# Patient Record
Sex: FEMALE | Race: WHITE | NOT HISPANIC OR LATINO | Employment: OTHER | URBAN - METROPOLITAN AREA
[De-identification: names, ages, dates, MRNs, and addresses within clinical notes are randomized per-mention and may not be internally consistent; named-entity substitution may affect disease eponyms.]

---

## 2017-01-29 ENCOUNTER — COMMUNICATION - HEALTHEAST (OUTPATIENT)
Dept: INTERNAL MEDICINE | Facility: CLINIC | Age: 77
End: 2017-01-29

## 2017-03-03 ENCOUNTER — OFFICE VISIT - HEALTHEAST (OUTPATIENT)
Dept: INTERNAL MEDICINE | Facility: CLINIC | Age: 77
End: 2017-03-03

## 2017-03-03 DIAGNOSIS — J44.9 COPD (CHRONIC OBSTRUCTIVE PULMONARY DISEASE) (H): ICD-10-CM

## 2017-03-03 LAB
CHOLEST SERPL-MCNC: 188 MG/DL
FASTING STATUS PATIENT QL REPORTED: YES
HDLC SERPL-MCNC: 88 MG/DL
LDLC SERPL CALC-MCNC: 86 MG/DL
TRIGL SERPL-MCNC: 70 MG/DL

## 2017-03-03 ASSESSMENT — MIFFLIN-ST. JEOR: SCORE: 1129.73

## 2017-03-06 ENCOUNTER — COMMUNICATION - HEALTHEAST (OUTPATIENT)
Dept: INTERNAL MEDICINE | Facility: CLINIC | Age: 77
End: 2017-03-06

## 2017-03-27 ENCOUNTER — RECORDS - HEALTHEAST (OUTPATIENT)
Dept: ADMINISTRATIVE | Facility: OTHER | Age: 77
End: 2017-03-27

## 2017-04-04 ENCOUNTER — RECORDS - HEALTHEAST (OUTPATIENT)
Dept: ADMINISTRATIVE | Facility: OTHER | Age: 77
End: 2017-04-04

## 2017-04-12 ENCOUNTER — COMMUNICATION - HEALTHEAST (OUTPATIENT)
Dept: SCHEDULING | Facility: CLINIC | Age: 77
End: 2017-04-12

## 2017-04-17 ENCOUNTER — RECORDS - HEALTHEAST (OUTPATIENT)
Dept: ADMINISTRATIVE | Facility: OTHER | Age: 77
End: 2017-04-17

## 2017-05-07 ENCOUNTER — COMMUNICATION - HEALTHEAST (OUTPATIENT)
Dept: INTERNAL MEDICINE | Facility: CLINIC | Age: 77
End: 2017-05-07

## 2017-05-07 DIAGNOSIS — J44.9 COPD (CHRONIC OBSTRUCTIVE PULMONARY DISEASE) (H): ICD-10-CM

## 2017-05-15 ENCOUNTER — COMMUNICATION - HEALTHEAST (OUTPATIENT)
Dept: INTERNAL MEDICINE | Facility: CLINIC | Age: 77
End: 2017-05-15

## 2017-05-15 DIAGNOSIS — J44.9 COPD (CHRONIC OBSTRUCTIVE PULMONARY DISEASE) (H): ICD-10-CM

## 2017-05-23 ENCOUNTER — COMMUNICATION - HEALTHEAST (OUTPATIENT)
Dept: INTERNAL MEDICINE | Facility: CLINIC | Age: 77
End: 2017-05-23

## 2017-05-23 DIAGNOSIS — I10 HTN (HYPERTENSION): ICD-10-CM

## 2017-07-03 ENCOUNTER — OFFICE VISIT - HEALTHEAST (OUTPATIENT)
Dept: INTERNAL MEDICINE | Facility: CLINIC | Age: 77
End: 2017-07-03

## 2017-07-03 DIAGNOSIS — I10 ESSENTIAL HYPERTENSION WITH GOAL BLOOD PRESSURE LESS THAN 140/90: ICD-10-CM

## 2017-07-03 ASSESSMENT — MIFFLIN-ST. JEOR: SCORE: 1113.26

## 2017-09-03 ENCOUNTER — COMMUNICATION - HEALTHEAST (OUTPATIENT)
Dept: INTERNAL MEDICINE | Facility: CLINIC | Age: 77
End: 2017-09-03

## 2017-09-11 ENCOUNTER — COMMUNICATION - HEALTHEAST (OUTPATIENT)
Dept: SCHEDULING | Facility: CLINIC | Age: 77
End: 2017-09-11

## 2017-09-14 ENCOUNTER — COMMUNICATION - HEALTHEAST (OUTPATIENT)
Dept: INTERNAL MEDICINE | Facility: CLINIC | Age: 77
End: 2017-09-14

## 2017-09-17 ENCOUNTER — COMMUNICATION - HEALTHEAST (OUTPATIENT)
Dept: INTERNAL MEDICINE | Facility: CLINIC | Age: 77
End: 2017-09-17

## 2017-09-25 ENCOUNTER — RECORDS - HEALTHEAST (OUTPATIENT)
Dept: ADMINISTRATIVE | Facility: OTHER | Age: 77
End: 2017-09-25

## 2017-10-03 ENCOUNTER — OFFICE VISIT - HEALTHEAST (OUTPATIENT)
Dept: INTERNAL MEDICINE | Facility: CLINIC | Age: 77
End: 2017-10-03

## 2017-10-03 DIAGNOSIS — J44.9 CHRONIC OBSTRUCTIVE PULMONARY DISEASE, UNSPECIFIED COPD TYPE (H): ICD-10-CM

## 2017-10-03 ASSESSMENT — MIFFLIN-ST. JEOR: SCORE: 1090.03

## 2017-11-07 ENCOUNTER — RECORDS - HEALTHEAST (OUTPATIENT)
Dept: ADMINISTRATIVE | Facility: OTHER | Age: 77
End: 2017-11-07

## 2017-11-26 ENCOUNTER — COMMUNICATION - HEALTHEAST (OUTPATIENT)
Dept: MEDSURG UNIT | Facility: CLINIC | Age: 77
End: 2017-11-26

## 2017-11-26 DIAGNOSIS — J44.1 COPD EXACERBATION (H): ICD-10-CM

## 2017-12-05 ENCOUNTER — OFFICE VISIT - HEALTHEAST (OUTPATIENT)
Dept: INTERNAL MEDICINE | Facility: CLINIC | Age: 77
End: 2017-12-05

## 2017-12-05 DIAGNOSIS — J44.9 CHRONIC OBSTRUCTIVE PULMONARY DISEASE, UNSPECIFIED COPD TYPE (H): ICD-10-CM

## 2017-12-05 ASSESSMENT — MIFFLIN-ST. JEOR: SCORE: 1070.76

## 2017-12-17 ENCOUNTER — COMMUNICATION - HEALTHEAST (OUTPATIENT)
Dept: INTERNAL MEDICINE | Facility: CLINIC | Age: 77
End: 2017-12-17

## 2018-01-22 ENCOUNTER — RECORDS - HEALTHEAST (OUTPATIENT)
Dept: ADMINISTRATIVE | Facility: OTHER | Age: 78
End: 2018-01-22

## 2018-01-24 ENCOUNTER — COMMUNICATION - HEALTHEAST (OUTPATIENT)
Dept: INTERNAL MEDICINE | Facility: CLINIC | Age: 78
End: 2018-01-24

## 2018-01-25 ENCOUNTER — RECORDS - HEALTHEAST (OUTPATIENT)
Dept: ADMINISTRATIVE | Facility: OTHER | Age: 78
End: 2018-01-25

## 2018-02-06 ENCOUNTER — COMMUNICATION - HEALTHEAST (OUTPATIENT)
Dept: INTERNAL MEDICINE | Facility: CLINIC | Age: 78
End: 2018-02-06

## 2018-02-06 ENCOUNTER — OFFICE VISIT - HEALTHEAST (OUTPATIENT)
Dept: INTERNAL MEDICINE | Facility: CLINIC | Age: 78
End: 2018-02-06

## 2018-02-06 DIAGNOSIS — J44.9 CHRONIC OBSTRUCTIVE PULMONARY DISEASE, UNSPECIFIED COPD TYPE (H): ICD-10-CM

## 2018-02-06 LAB
CHOLEST SERPL-MCNC: 199 MG/DL
FASTING STATUS PATIENT QL REPORTED: NORMAL
HDLC SERPL-MCNC: 98 MG/DL
LDLC SERPL CALC-MCNC: 92 MG/DL
TRIGL SERPL-MCNC: 44 MG/DL

## 2018-02-06 ASSESSMENT — MIFFLIN-ST. JEOR: SCORE: 1070.76

## 2018-03-12 ENCOUNTER — RECORDS - HEALTHEAST (OUTPATIENT)
Dept: ADMINISTRATIVE | Facility: OTHER | Age: 78
End: 2018-03-12

## 2018-03-30 ENCOUNTER — COMMUNICATION - HEALTHEAST (OUTPATIENT)
Dept: INTERNAL MEDICINE | Facility: CLINIC | Age: 78
End: 2018-03-30

## 2018-04-17 ENCOUNTER — OFFICE VISIT - HEALTHEAST (OUTPATIENT)
Dept: INTERNAL MEDICINE | Facility: CLINIC | Age: 78
End: 2018-04-17

## 2018-04-17 DIAGNOSIS — J44.9 CHRONIC OBSTRUCTIVE PULMONARY DISEASE, UNSPECIFIED COPD TYPE (H): ICD-10-CM

## 2018-04-17 ASSESSMENT — MIFFLIN-ST. JEOR: SCORE: 1075.3

## 2018-05-14 ENCOUNTER — RECORDS - HEALTHEAST (OUTPATIENT)
Dept: ADMINISTRATIVE | Facility: OTHER | Age: 78
End: 2018-05-14

## 2018-05-27 ENCOUNTER — COMMUNICATION - HEALTHEAST (OUTPATIENT)
Dept: INTERNAL MEDICINE | Facility: CLINIC | Age: 78
End: 2018-05-27

## 2018-05-27 DIAGNOSIS — I10 HTN (HYPERTENSION): ICD-10-CM

## 2018-07-17 ENCOUNTER — OFFICE VISIT - HEALTHEAST (OUTPATIENT)
Dept: PODIATRY | Facility: CLINIC | Age: 78
End: 2018-07-17

## 2018-07-17 DIAGNOSIS — L84 TYLOMA: ICD-10-CM

## 2018-08-17 ENCOUNTER — OFFICE VISIT - HEALTHEAST (OUTPATIENT)
Dept: INTERNAL MEDICINE | Facility: CLINIC | Age: 78
End: 2018-08-17

## 2018-08-17 DIAGNOSIS — J44.9 CHRONIC OBSTRUCTIVE PULMONARY DISEASE, UNSPECIFIED COPD TYPE (H): ICD-10-CM

## 2018-08-17 ASSESSMENT — MIFFLIN-ST. JEOR: SCORE: 1075.3

## 2018-08-21 ENCOUNTER — RECORDS - HEALTHEAST (OUTPATIENT)
Dept: ADMINISTRATIVE | Facility: OTHER | Age: 78
End: 2018-08-21

## 2018-09-09 ENCOUNTER — COMMUNICATION - HEALTHEAST (OUTPATIENT)
Dept: INTERNAL MEDICINE | Facility: CLINIC | Age: 78
End: 2018-09-09

## 2018-10-08 ENCOUNTER — COMMUNICATION - HEALTHEAST (OUTPATIENT)
Dept: INTERNAL MEDICINE | Facility: CLINIC | Age: 78
End: 2018-10-08

## 2018-10-09 ENCOUNTER — AMBULATORY - HEALTHEAST (OUTPATIENT)
Dept: NURSING | Facility: CLINIC | Age: 78
End: 2018-10-09

## 2018-10-09 DIAGNOSIS — Z23 NEED FOR INFLUENZA VACCINATION: ICD-10-CM

## 2018-10-28 ENCOUNTER — COMMUNICATION - HEALTHEAST (OUTPATIENT)
Dept: MEDSURG UNIT | Facility: CLINIC | Age: 78
End: 2018-10-28

## 2018-10-28 DIAGNOSIS — J44.1 COPD EXACERBATION (H): ICD-10-CM

## 2018-10-29 RX ORDER — PREDNISONE 20 MG/1
TABLET ORAL
Qty: 8 TABLET | Refills: 0 | Status: SHIPPED | OUTPATIENT
Start: 2018-10-29 | End: 2021-07-26

## 2018-11-05 ENCOUNTER — OFFICE VISIT - HEALTHEAST (OUTPATIENT)
Dept: INTERNAL MEDICINE | Facility: CLINIC | Age: 78
End: 2018-11-05

## 2018-11-05 DIAGNOSIS — J44.9 CHRONIC OBSTRUCTIVE PULMONARY DISEASE, UNSPECIFIED COPD TYPE (H): ICD-10-CM

## 2018-11-05 ASSESSMENT — MIFFLIN-ST. JEOR: SCORE: 1066.22

## 2018-12-09 ENCOUNTER — COMMUNICATION - HEALTHEAST (OUTPATIENT)
Dept: INTERNAL MEDICINE | Facility: CLINIC | Age: 78
End: 2018-12-09

## 2019-01-13 ENCOUNTER — COMMUNICATION - HEALTHEAST (OUTPATIENT)
Dept: INTERNAL MEDICINE | Facility: CLINIC | Age: 79
End: 2019-01-13

## 2019-01-13 DIAGNOSIS — J44.9 COPD (CHRONIC OBSTRUCTIVE PULMONARY DISEASE) (H): ICD-10-CM

## 2019-02-11 ENCOUNTER — OFFICE VISIT - HEALTHEAST (OUTPATIENT)
Dept: INTERNAL MEDICINE | Facility: CLINIC | Age: 79
End: 2019-02-11

## 2019-02-11 DIAGNOSIS — I10 ESSENTIAL HYPERTENSION: ICD-10-CM

## 2019-02-11 LAB
CHOLEST SERPL-MCNC: 197 MG/DL
FASTING STATUS PATIENT QL REPORTED: YES
HDLC SERPL-MCNC: 106 MG/DL
LDLC SERPL CALC-MCNC: 82 MG/DL
TRIGL SERPL-MCNC: 47 MG/DL

## 2019-02-11 RX ORDER — DOCUSATE SODIUM 100 MG/1
100 CAPSULE, LIQUID FILLED ORAL 2 TIMES DAILY
Status: SHIPPED | COMMUNITY
Start: 2019-02-11

## 2019-02-11 ASSESSMENT — MIFFLIN-ST. JEOR: SCORE: 1075.3

## 2019-02-12 ENCOUNTER — COMMUNICATION - HEALTHEAST (OUTPATIENT)
Dept: INTERNAL MEDICINE | Facility: CLINIC | Age: 79
End: 2019-02-12

## 2019-02-16 ENCOUNTER — COMMUNICATION - HEALTHEAST (OUTPATIENT)
Dept: SCHEDULING | Facility: CLINIC | Age: 79
End: 2019-02-16

## 2019-02-16 DIAGNOSIS — M54.9 BACK PAIN: ICD-10-CM

## 2019-02-17 ENCOUNTER — COMMUNICATION - HEALTHEAST (OUTPATIENT)
Dept: SCHEDULING | Facility: CLINIC | Age: 79
End: 2019-02-17

## 2019-02-17 RX ORDER — CYCLOBENZAPRINE HCL 5 MG
5 TABLET ORAL 2 TIMES DAILY PRN
Qty: 20 TABLET | Refills: 1 | Status: SHIPPED | OUTPATIENT
Start: 2019-02-17 | End: 2022-02-11

## 2019-03-18 ENCOUNTER — RECORDS - HEALTHEAST (OUTPATIENT)
Dept: ADMINISTRATIVE | Facility: OTHER | Age: 79
End: 2019-03-18

## 2019-04-19 ENCOUNTER — RECORDS - HEALTHEAST (OUTPATIENT)
Dept: ADMINISTRATIVE | Facility: OTHER | Age: 79
End: 2019-04-19

## 2019-04-30 ENCOUNTER — RECORDS - HEALTHEAST (OUTPATIENT)
Dept: ADMINISTRATIVE | Facility: OTHER | Age: 79
End: 2019-04-30

## 2019-05-12 ENCOUNTER — COMMUNICATION - HEALTHEAST (OUTPATIENT)
Dept: INTERNAL MEDICINE | Facility: CLINIC | Age: 79
End: 2019-05-12

## 2019-05-12 DIAGNOSIS — I10 HTN (HYPERTENSION): ICD-10-CM

## 2019-06-17 ENCOUNTER — COMMUNICATION - HEALTHEAST (OUTPATIENT)
Dept: INTERNAL MEDICINE | Facility: CLINIC | Age: 79
End: 2019-06-17

## 2019-06-17 DIAGNOSIS — J44.9 COPD (CHRONIC OBSTRUCTIVE PULMONARY DISEASE) (H): ICD-10-CM

## 2019-07-02 ENCOUNTER — OFFICE VISIT - HEALTHEAST (OUTPATIENT)
Dept: INTERNAL MEDICINE | Facility: CLINIC | Age: 79
End: 2019-07-02

## 2019-07-02 DIAGNOSIS — J44.9 CHRONIC OBSTRUCTIVE PULMONARY DISEASE, UNSPECIFIED COPD TYPE (H): ICD-10-CM

## 2019-07-02 ASSESSMENT — MIFFLIN-ST. JEOR: SCORE: 1084.91

## 2019-09-23 ENCOUNTER — AMBULATORY - HEALTHEAST (OUTPATIENT)
Dept: NURSING | Facility: CLINIC | Age: 79
End: 2019-09-23

## 2019-09-23 DIAGNOSIS — Z23 FLU VACCINE NEED: ICD-10-CM

## 2019-10-17 ENCOUNTER — COMMUNICATION - HEALTHEAST (OUTPATIENT)
Dept: INTERNAL MEDICINE | Facility: CLINIC | Age: 79
End: 2019-10-17

## 2019-10-17 ENCOUNTER — OFFICE VISIT - HEALTHEAST (OUTPATIENT)
Dept: INTERNAL MEDICINE | Facility: CLINIC | Age: 79
End: 2019-10-17

## 2019-10-17 DIAGNOSIS — I10 ESSENTIAL HYPERTENSION: ICD-10-CM

## 2019-10-17 DIAGNOSIS — J44.9 CHRONIC OBSTRUCTIVE PULMONARY DISEASE, UNSPECIFIED COPD TYPE (H): ICD-10-CM

## 2019-10-17 DIAGNOSIS — F33.9 EPISODE OF RECURRENT MAJOR DEPRESSIVE DISORDER, UNSPECIFIED DEPRESSION EPISODE SEVERITY (H): ICD-10-CM

## 2019-10-17 LAB
CHOLEST SERPL-MCNC: 205 MG/DL
FASTING STATUS PATIENT QL REPORTED: ABNORMAL
HDLC SERPL-MCNC: 110 MG/DL
LDLC SERPL CALC-MCNC: 81 MG/DL
TRIGL SERPL-MCNC: 69 MG/DL

## 2019-10-17 RX ORDER — LACTOBACILLUS RHAMNOSUS GG 15B CELL
1 CAPSULE, SPRINKLE ORAL 2 TIMES DAILY WITH MEALS
Status: SHIPPED | COMMUNITY
Start: 2019-10-17

## 2019-10-17 ASSESSMENT — MIFFLIN-ST. JEOR: SCORE: 1084.37

## 2019-10-17 ASSESSMENT — PATIENT HEALTH QUESTIONNAIRE - PHQ9: SUM OF ALL RESPONSES TO PHQ QUESTIONS 1-9: 0

## 2019-10-21 ENCOUNTER — RECORDS - HEALTHEAST (OUTPATIENT)
Dept: ADMINISTRATIVE | Facility: OTHER | Age: 79
End: 2019-10-21

## 2019-12-08 ENCOUNTER — COMMUNICATION - HEALTHEAST (OUTPATIENT)
Dept: INTERNAL MEDICINE | Facility: CLINIC | Age: 79
End: 2019-12-08

## 2019-12-08 DIAGNOSIS — F32.A DEPRESSION: ICD-10-CM

## 2019-12-08 DIAGNOSIS — F33.9 MAJOR DEPRESSIVE DISORDER, RECURRENT EPISODE (H): ICD-10-CM

## 2019-12-15 ENCOUNTER — COMMUNICATION - HEALTHEAST (OUTPATIENT)
Dept: INTERNAL MEDICINE | Facility: CLINIC | Age: 79
End: 2019-12-15

## 2019-12-15 DIAGNOSIS — J44.9 CHRONIC OBSTRUCTIVE PULMONARY DISEASE, UNSPECIFIED COPD TYPE (H): ICD-10-CM

## 2019-12-16 RX ORDER — ALBUTEROL SULFATE 0.83 MG/ML
SOLUTION RESPIRATORY (INHALATION)
Qty: 270 ML | Refills: 1 | Status: SHIPPED | OUTPATIENT
Start: 2019-12-16 | End: 2021-07-25

## 2020-01-19 ENCOUNTER — COMMUNICATION - HEALTHEAST (OUTPATIENT)
Dept: INTERNAL MEDICINE | Facility: CLINIC | Age: 80
End: 2020-01-19

## 2020-01-19 DIAGNOSIS — J44.9 CHRONIC OBSTRUCTIVE PULMONARY DISEASE, UNSPECIFIED COPD TYPE (H): ICD-10-CM

## 2020-02-09 ENCOUNTER — COMMUNICATION - HEALTHEAST (OUTPATIENT)
Dept: INTERNAL MEDICINE | Facility: CLINIC | Age: 80
End: 2020-02-09

## 2020-02-09 DIAGNOSIS — I10 HTN (HYPERTENSION): ICD-10-CM

## 2020-02-13 ENCOUNTER — RECORDS - HEALTHEAST (OUTPATIENT)
Dept: SCHEDULING | Facility: CLINIC | Age: 80
End: 2020-02-13

## 2020-02-16 ENCOUNTER — COMMUNICATION - HEALTHEAST (OUTPATIENT)
Dept: INTERNAL MEDICINE | Facility: CLINIC | Age: 80
End: 2020-02-16

## 2020-02-16 DIAGNOSIS — J44.9 COPD (CHRONIC OBSTRUCTIVE PULMONARY DISEASE) (H): ICD-10-CM

## 2020-02-18 ENCOUNTER — OFFICE VISIT - HEALTHEAST (OUTPATIENT)
Dept: INTERNAL MEDICINE | Facility: CLINIC | Age: 80
End: 2020-02-18

## 2020-02-18 ENCOUNTER — COMMUNICATION - HEALTHEAST (OUTPATIENT)
Dept: INTERNAL MEDICINE | Facility: CLINIC | Age: 80
End: 2020-02-18

## 2020-02-18 DIAGNOSIS — J44.9 COPD (CHRONIC OBSTRUCTIVE PULMONARY DISEASE) (H): ICD-10-CM

## 2020-02-18 DIAGNOSIS — I10 ESSENTIAL HYPERTENSION: ICD-10-CM

## 2020-02-18 LAB
CHOLEST SERPL-MCNC: 206 MG/DL
FASTING STATUS PATIENT QL REPORTED: YES
HDLC SERPL-MCNC: 110 MG/DL
LDLC SERPL CALC-MCNC: 86 MG/DL
TRIGL SERPL-MCNC: 49 MG/DL

## 2020-02-18 ASSESSMENT — MIFFLIN-ST. JEOR: SCORE: 1079.83

## 2020-03-22 ENCOUNTER — COMMUNICATION - HEALTHEAST (OUTPATIENT)
Dept: INTERNAL MEDICINE | Facility: CLINIC | Age: 80
End: 2020-03-22

## 2020-03-22 DIAGNOSIS — Z00.00 ROUTINE GENERAL MEDICAL EXAMINATION AT A HEALTH CARE FACILITY: ICD-10-CM

## 2020-05-17 ENCOUNTER — COMMUNICATION - HEALTHEAST (OUTPATIENT)
Dept: INTERNAL MEDICINE | Facility: CLINIC | Age: 80
End: 2020-05-17

## 2020-05-17 DIAGNOSIS — I10 HTN (HYPERTENSION): ICD-10-CM

## 2020-06-21 ENCOUNTER — COMMUNICATION - HEALTHEAST (OUTPATIENT)
Dept: INTERNAL MEDICINE | Facility: CLINIC | Age: 80
End: 2020-06-21

## 2020-06-21 DIAGNOSIS — I10 ESSENTIAL HYPERTENSION: ICD-10-CM

## 2020-08-18 ENCOUNTER — OFFICE VISIT - HEALTHEAST (OUTPATIENT)
Dept: INTERNAL MEDICINE | Facility: CLINIC | Age: 80
End: 2020-08-18

## 2020-08-18 ENCOUNTER — COMMUNICATION - HEALTHEAST (OUTPATIENT)
Dept: INTERNAL MEDICINE | Facility: CLINIC | Age: 80
End: 2020-08-18

## 2020-08-18 DIAGNOSIS — Z00.00 ROUTINE GENERAL MEDICAL EXAMINATION AT A HEALTH CARE FACILITY: ICD-10-CM

## 2020-08-18 LAB
ALBUMIN SERPL-MCNC: 4 G/DL (ref 3.5–5)
ALP SERPL-CCNC: 65 U/L (ref 45–120)
ALT SERPL W P-5'-P-CCNC: 20 U/L (ref 0–45)
ANION GAP SERPL CALCULATED.3IONS-SCNC: 8 MMOL/L (ref 5–18)
AST SERPL W P-5'-P-CCNC: 26 U/L (ref 0–40)
BILIRUB SERPL-MCNC: 0.4 MG/DL (ref 0–1)
BUN SERPL-MCNC: 15 MG/DL (ref 8–28)
CALCIUM SERPL-MCNC: 9.7 MG/DL (ref 8.5–10.5)
CHLORIDE BLD-SCNC: 108 MMOL/L (ref 98–107)
CHOLEST SERPL-MCNC: 215 MG/DL
CO2 SERPL-SCNC: 28 MMOL/L (ref 22–31)
CREAT SERPL-MCNC: 1 MG/DL (ref 0.6–1.1)
ERYTHROCYTE [DISTWIDTH] IN BLOOD BY AUTOMATED COUNT: 12.8 % (ref 11–14.5)
FASTING STATUS PATIENT QL REPORTED: ABNORMAL
GFR SERPL CREATININE-BSD FRML MDRD: 53 ML/MIN/1.73M2
GLUCOSE BLD-MCNC: 78 MG/DL (ref 70–125)
HCT VFR BLD AUTO: 43.6 % (ref 35–47)
HDLC SERPL-MCNC: 113 MG/DL
HGB BLD-MCNC: 14.2 G/DL (ref 12–16)
LDLC SERPL CALC-MCNC: 90 MG/DL
MCH RBC QN AUTO: 29.7 PG (ref 27–34)
MCHC RBC AUTO-ENTMCNC: 32.5 G/DL (ref 32–36)
MCV RBC AUTO: 91 FL (ref 80–100)
PLATELET # BLD AUTO: 215 THOU/UL (ref 140–440)
PMV BLD AUTO: 7.9 FL (ref 7–10)
POTASSIUM BLD-SCNC: 4.2 MMOL/L (ref 3.5–5)
PROT SERPL-MCNC: 6.4 G/DL (ref 6–8)
RBC # BLD AUTO: 4.78 MILL/UL (ref 3.8–5.4)
SODIUM SERPL-SCNC: 144 MMOL/L (ref 136–145)
TRIGL SERPL-MCNC: 62 MG/DL
TSH SERPL DL<=0.005 MIU/L-ACNC: 1.95 UIU/ML (ref 0.3–5)
WBC: 6.2 THOU/UL (ref 4–11)

## 2020-08-18 ASSESSMENT — MIFFLIN-ST. JEOR: SCORE: 1067.92

## 2020-08-18 ASSESSMENT — PATIENT HEALTH QUESTIONNAIRE - PHQ9: SUM OF ALL RESPONSES TO PHQ QUESTIONS 1-9: 0

## 2020-10-23 ENCOUNTER — RECORDS - HEALTHEAST (OUTPATIENT)
Dept: ADMINISTRATIVE | Facility: OTHER | Age: 80
End: 2020-10-23

## 2020-11-02 ENCOUNTER — COMMUNICATION - HEALTHEAST (OUTPATIENT)
Dept: SCHEDULING | Facility: CLINIC | Age: 80
End: 2020-11-02

## 2020-11-13 ENCOUNTER — OFFICE VISIT - HEALTHEAST (OUTPATIENT)
Dept: INTERNAL MEDICINE | Facility: CLINIC | Age: 80
End: 2020-11-13

## 2020-11-13 ENCOUNTER — RECORDS - HEALTHEAST (OUTPATIENT)
Dept: GENERAL RADIOLOGY | Facility: CLINIC | Age: 80
End: 2020-11-13

## 2020-11-13 DIAGNOSIS — M19.90 OSTEOARTHRITIS, UNSPECIFIED OSTEOARTHRITIS TYPE, UNSPECIFIED SITE: ICD-10-CM

## 2020-11-13 DIAGNOSIS — M19.90 UNSPECIFIED OSTEOARTHRITIS, UNSPECIFIED SITE: ICD-10-CM

## 2020-11-13 ASSESSMENT — MIFFLIN-ST. JEOR: SCORE: 1067.92

## 2020-11-15 ENCOUNTER — COMMUNICATION - HEALTHEAST (OUTPATIENT)
Dept: INTERNAL MEDICINE | Facility: CLINIC | Age: 80
End: 2020-11-15

## 2020-11-15 DIAGNOSIS — F32.A DEPRESSION: ICD-10-CM

## 2020-11-15 DIAGNOSIS — F33.9 MAJOR DEPRESSIVE DISORDER, RECURRENT EPISODE (H): ICD-10-CM

## 2020-11-17 RX ORDER — CITALOPRAM HYDROBROMIDE 20 MG/1
TABLET ORAL
Qty: 90 TABLET | Refills: 3 | Status: SHIPPED | OUTPATIENT
Start: 2020-11-17 | End: 2021-11-12

## 2020-12-07 ENCOUNTER — RECORDS - HEALTHEAST (OUTPATIENT)
Dept: ADMINISTRATIVE | Facility: OTHER | Age: 80
End: 2020-12-07

## 2020-12-22 ENCOUNTER — RECORDS - HEALTHEAST (OUTPATIENT)
Dept: ADMINISTRATIVE | Facility: OTHER | Age: 80
End: 2020-12-22

## 2021-01-11 ENCOUNTER — OFFICE VISIT - HEALTHEAST (OUTPATIENT)
Dept: INTERNAL MEDICINE | Facility: CLINIC | Age: 81
End: 2021-01-11

## 2021-01-11 DIAGNOSIS — G25.81 RESTLESS LEGS SYNDROME (RLS): ICD-10-CM

## 2021-01-11 ASSESSMENT — MIFFLIN-ST. JEOR: SCORE: 1063.39

## 2021-01-31 ENCOUNTER — COMMUNICATION - HEALTHEAST (OUTPATIENT)
Dept: INTERNAL MEDICINE | Facility: CLINIC | Age: 81
End: 2021-01-31

## 2021-01-31 DIAGNOSIS — I10 HTN (HYPERTENSION): ICD-10-CM

## 2021-02-01 RX ORDER — SIMVASTATIN 20 MG
TABLET ORAL
Qty: 90 TABLET | Refills: 3 | Status: SHIPPED | OUTPATIENT
Start: 2021-02-01 | End: 2022-02-15

## 2021-02-03 ENCOUNTER — COMMUNICATION - HEALTHEAST (OUTPATIENT)
Dept: INTERNAL MEDICINE | Facility: CLINIC | Age: 81
End: 2021-02-03

## 2021-02-03 DIAGNOSIS — G25.81 RESTLESS LEGS SYNDROME (RLS): ICD-10-CM

## 2021-02-12 ENCOUNTER — OFFICE VISIT - HEALTHEAST (OUTPATIENT)
Dept: INTERNAL MEDICINE | Facility: CLINIC | Age: 81
End: 2021-02-12

## 2021-02-12 DIAGNOSIS — I10 ESSENTIAL HYPERTENSION: ICD-10-CM

## 2021-02-12 RX ORDER — FERROUS GLUCONATE 324(38)MG
324 TABLET ORAL
Status: SHIPPED | COMMUNITY
Start: 2021-02-12 | End: 2021-12-10

## 2021-02-12 RX ORDER — SENNOSIDES 8.6 MG
650 CAPSULE ORAL AT BEDTIME
Status: SHIPPED | COMMUNITY
Start: 2021-02-12

## 2021-02-12 RX ORDER — LISINOPRIL/HYDROCHLOROTHIAZIDE 10-12.5 MG
1 TABLET ORAL DAILY
Qty: 90 TABLET | Refills: 3 | Status: SHIPPED | OUTPATIENT
Start: 2021-02-12 | End: 2022-02-15

## 2021-02-12 ASSESSMENT — MIFFLIN-ST. JEOR: SCORE: 1063.39

## 2021-03-02 ENCOUNTER — AMBULATORY - HEALTHEAST (OUTPATIENT)
Dept: NURSING | Facility: CLINIC | Age: 81
End: 2021-03-02

## 2021-03-21 ENCOUNTER — COMMUNICATION - HEALTHEAST (OUTPATIENT)
Dept: INTERNAL MEDICINE | Facility: CLINIC | Age: 81
End: 2021-03-21

## 2021-03-21 DIAGNOSIS — J44.9 CHRONIC OBSTRUCTIVE PULMONARY DISEASE, UNSPECIFIED COPD TYPE (H): ICD-10-CM

## 2021-03-23 ENCOUNTER — AMBULATORY - HEALTHEAST (OUTPATIENT)
Dept: NURSING | Facility: CLINIC | Age: 81
End: 2021-03-23

## 2021-03-26 ENCOUNTER — OFFICE VISIT - HEALTHEAST (OUTPATIENT)
Dept: INTERNAL MEDICINE | Facility: CLINIC | Age: 81
End: 2021-03-26

## 2021-03-26 DIAGNOSIS — I10 ESSENTIAL HYPERTENSION: ICD-10-CM

## 2021-03-26 ASSESSMENT — MIFFLIN-ST. JEOR: SCORE: 1072.46

## 2021-03-26 ASSESSMENT — PATIENT HEALTH QUESTIONNAIRE - PHQ9: SUM OF ALL RESPONSES TO PHQ QUESTIONS 1-9: 0

## 2021-05-03 ENCOUNTER — COMMUNICATION - HEALTHEAST (OUTPATIENT)
Dept: INTERNAL MEDICINE | Facility: CLINIC | Age: 81
End: 2021-05-03

## 2021-05-03 DIAGNOSIS — G25.81 RESTLESS LEGS SYNDROME (RLS): ICD-10-CM

## 2021-05-03 RX ORDER — ROPINIROLE 0.5 MG/1
0.5 TABLET, FILM COATED ORAL AT BEDTIME
Qty: 30 TABLET | Refills: 11 | Status: SHIPPED | OUTPATIENT
Start: 2021-05-03 | End: 2022-04-14

## 2021-05-18 ENCOUNTER — RECORDS - HEALTHEAST (OUTPATIENT)
Dept: ADMINISTRATIVE | Facility: OTHER | Age: 81
End: 2021-05-18

## 2021-05-25 ENCOUNTER — RECORDS - HEALTHEAST (OUTPATIENT)
Dept: ADMINISTRATIVE | Facility: CLINIC | Age: 81
End: 2021-05-25

## 2021-05-26 ASSESSMENT — PATIENT HEALTH QUESTIONNAIRE - PHQ9
SUM OF ALL RESPONSES TO PHQ QUESTIONS 1-9: 0
SUM OF ALL RESPONSES TO PHQ QUESTIONS 1-9: 0

## 2021-05-27 ENCOUNTER — RECORDS - HEALTHEAST (OUTPATIENT)
Dept: ADMINISTRATIVE | Facility: CLINIC | Age: 81
End: 2021-05-27

## 2021-05-27 ASSESSMENT — PATIENT HEALTH QUESTIONNAIRE - PHQ9: SUM OF ALL RESPONSES TO PHQ QUESTIONS 1-9: 0

## 2021-05-28 NOTE — TELEPHONE ENCOUNTER
Refill Approved    Rx renewed per Medication Renewal Policy. Medication was last renewed on 5/28/2018.    Last OV: 2/11/2019.    Shaun Mesa, [facility] Triage/Med Refill 5/12/2019     Requested Prescriptions   Pending Prescriptions Disp Refills     simvastatin (ZOCOR) 20 MG tablet [Pharmacy Med Name: SIMVASTATIN 20 MG TABLET] 90 tablet 3     Sig: TAKE ONE TABLET BY MOUTH ONE TIME DAILY       Statins Refill Protocol (Hmg CoA Reductase Inhibitors) Passed - 5/12/2019 10:51 AM        Passed - PCP or prescribing provider visit in past 12 months      Last office visit with prescriber/PCP: 2/11/2019 Darius Mendoza MD OR same dept: 2/11/2019 Darius Mendoza MD OR same specialty: 2/11/2019 Darius Mendoza MD  Last physical: Visit date not found Last MTM visit: Visit date not found   Next visit within 3 mo: Visit date not found  Next physical within 3 mo: Visit date not found  Prescriber OR PCP: Darius Mendoza MD  Last diagnosis associated with med order: 1. HTN (hypertension)  - simvastatin (ZOCOR) 20 MG tablet [Pharmacy Med Name: SIMVASTATIN 20 MG TABLET]; TAKE ONE TABLET BY MOUTH ONE TIME DAILY  Dispense: 90 tablet; Refill: 3    If protocol passes may refill for 12 months if within 3 months of last provider visit (or a total of 15 months).

## 2021-05-29 ENCOUNTER — RECORDS - HEALTHEAST (OUTPATIENT)
Dept: ADMINISTRATIVE | Facility: CLINIC | Age: 81
End: 2021-05-29

## 2021-05-29 NOTE — TELEPHONE ENCOUNTER
RN cannot approve Refill Request    RN can NOT refill this medication med is not covered by policy/route to provider. Last office visit: 2/11/2019 Darius Mendoza MD Last Physical: Visit date not found Last MTM visit: Visit date not found Last visit same specialty: 2/11/2019 Darius Mendoza MD.  Next visit within 3 mo: Visit date not found  Next physical within 3 mo: Visit date not found      Jacquelin Conti, [facility] Triage/Med Refill 6/18/2019    Requested Prescriptions   Pending Prescriptions Disp Refills     ipratropium-albuterol (COMBIVENT RESPIMAT)  mcg/actuation Mist inhaler 3 Inhaler 0     Sig: INHALE ONE PUFF BY MOUTH FOUR TIMES DAILY       There is no refill protocol information for this order

## 2021-05-30 ENCOUNTER — RECORDS - HEALTHEAST (OUTPATIENT)
Dept: ADMINISTRATIVE | Facility: CLINIC | Age: 81
End: 2021-05-30

## 2021-05-30 VITALS — WEIGHT: 147 LBS | BODY MASS INDEX: 24.49 KG/M2 | HEIGHT: 65 IN

## 2021-05-30 NOTE — PROGRESS NOTES
Office Visit - Follow up    Alpa Ty   78 y.o. female    Date of Visit: 7/2/2019    Chief Complaint   Patient presents with     COPD     Hyperlipidemia     Follow-up       Subjective: COPD.    Quit smoking over a year ago.  No new cardiopulmonary complaints denies shortness of breath cough chest pain or hemoptysis.    Seborrheic keratotic lesions noted on her back she wants removed suggest Calvin consult.    Last lipids were checked on February 11, 2019.    No blood in stool urine or sputum.    Medication list reconciled in the chart no ill effects well-tolerated.  No known drug allergies.    ROS: A comprehensive review of systems was performed and was otherwise negative    Medications:  Prior to Admission medications    Medication Sig Start Date End Date Taking? Authorizing Provider   albuterol (PROVENTIL) 2.5 mg /3 mL (0.083 %) nebulizer solution INHALE 1 VIAL VIA NEBULIZER EVERY 6 HOURS FOR WHEEZING 9/9/18  Yes Darius Mendoza MD   budesonide-formoterol (SYMBICORT) 160-4.5 mcg/actuation inhaler Inhale 2 puffs 2 (two) times a day. 2/11/19  Yes Darius Mendoza MD   calcium carbonate-vitamin D2 500 mg(1,250mg) -200 unit tablet Take 1 tablet by mouth daily.   Yes Darius Mendoza MD   cholecalciferol, vitamin D3, (VITAMIN D3) 5,000 unit Tab Take by mouth.   Yes PROVIDER, HISTORICAL   citalopram (CELEXA) 20 MG tablet TAKE ONE TABLET BY MOUTH ONE TIME DAILY 12/10/18  Yes Darius Mendoza MD   cyclobenzaprine (FLEXERIL) 5 MG tablet Take 1 tablet (5 mg total) by mouth 2 (two) times a day as needed for muscle spasms. 2/17/19  Yes Darius Mendoza MD   docusate sodium (COLACE) 100 MG capsule Take 100 mg by mouth 2 (two) times a day.   Yes PROVIDER, HISTORICAL   INHALER, ASSIST DEVICES (AEROCHAMBER PLUS Z STAT MISC) Use As Directed.   Yes Darius Mendoza MD   ipratropium (ATROVENT) 0.02 % nebulizer solution INHALE ONE VIAL VIA NEBULIZER EVERY SIX TO EIGHT HOURS AS DIRECTED 10/8/18  Yes  Darius Mendoza MD   ipratropium-albuterol (COMBIVENT RESPIMAT)  mcg/actuation Mist inhaler INHALE ONE PUFF BY MOUTH FOUR TIMES DAILY 6/18/19  Yes Darius Mendoza MD   multivitamin with minerals (THERA-M) 9 mg iron-400 mcg Tab tablet Take 1 tablet by mouth at bedtime.   Yes PROVIDER, HISTORICAL   predniSONE (DELTASONE) 20 MG tablet TAKE 2 TABLETS (40 MG TOTAL) BY MOUTH DAILY WITH BREAKFAST FOR 4 DAYS.  Patient taking differently: No sig reported 10/29/18  Yes Darius Mendoza MD   simvastatin (ZOCOR) 20 MG tablet TAKE ONE TABLET BY MOUTH ONE TIME DAILY 5/12/19  Yes Darius Mendoza MD       Allergies: No Known Allergies    Immunizations:   Immunization History   Administered Date(s) Administered     DT (pediatric) 01/06/2005     Influenza high dose, seasonal 10/08/2015, 10/24/2016, 10/03/2017, 10/09/2018     Influenza, inj, historic,unspecified 11/20/2007, 10/23/2008, 10/19/2010, 10/20/2011     Influenza, seasonal,quad inj 6-35 mos 10/09/2012, 11/15/2013, 10/29/2014     Pneumo Conj 13-V (2010&after) 07/28/2015     Pneumo Polysac 23-V 01/01/2002, 06/22/2010     Td,adult,historic,unspecified 01/06/2005     Tdap 01/27/2015       Exam Chest clear to auscultation and percussion.  Heart tones regular rhythm without murmur rub or gallop.  Abdomen soft nontender no organomegaly.  No peritoneal signs.  Extremities free of edema cyanosis or clubbing.  Neck veins nondistended no thyromegaly or scleral icterus noted, carotids full.  Skin warm and dry easily conversant good spirited.  Normal intelligence.  Neurologically intact no gross localizing findings.  Diminished breath sounds throughout scattered dry rales no rhonchi or wheezes.  No respiratory distress.    Assessment and Plan  COPD off smoking for greater than 1 year.  On low-dose prednisone therapy 20 mg daily suggest annual flu vaccine and immunization should be kept up-to-date including pneumonia vaccines SD    Hyperlipidemia last lipids  evaluated February 11, 2019 check lipid panel today no target organ damage related to same.    Overweight discussed see below.    History of depression melancholy continue citalopram 20 mg daily no suicidal ideations.    Time: total time spent with the patient was 25 minutes of which >50% was spent in counseling and coordination of care    The following high BMI interventions were performed this visit: encouragement to exercise    Darius Mendoza MD    Patient Active Problem List   Diagnosis     Osteopenia     Chronic Major Depression     Hyperlipidemia     Chronic Bronchitis With Acute Exacerbation     Shingles     COPD (chronic obstructive pulmonary disease) (H)     Hypoxia     Depression     Tobacco abuse

## 2021-05-31 VITALS — BODY MASS INDEX: 21.38 KG/M2 | WEIGHT: 133 LBS | HEIGHT: 66 IN

## 2021-05-31 VITALS — BODY MASS INDEX: 22.33 KG/M2 | HEIGHT: 65 IN | WEIGHT: 134 LBS

## 2021-05-31 VITALS — BODY MASS INDEX: 22.2 KG/M2 | HEIGHT: 66 IN | WEIGHT: 138.12 LBS

## 2021-06-01 ENCOUNTER — RECORDS - HEALTHEAST (OUTPATIENT)
Dept: ADMINISTRATIVE | Facility: CLINIC | Age: 81
End: 2021-06-01

## 2021-06-01 VITALS — BODY MASS INDEX: 22.49 KG/M2 | HEIGHT: 65 IN | WEIGHT: 135 LBS

## 2021-06-01 VITALS — HEIGHT: 65 IN | WEIGHT: 134 LBS | BODY MASS INDEX: 22.33 KG/M2

## 2021-06-02 VITALS — HEIGHT: 65 IN | BODY MASS INDEX: 22.16 KG/M2 | WEIGHT: 133 LBS

## 2021-06-02 VITALS — WEIGHT: 135 LBS | BODY MASS INDEX: 22.49 KG/M2 | HEIGHT: 65 IN

## 2021-06-02 NOTE — PROGRESS NOTES
Office Visit - Follow up    Alpa Ty   78 y.o. female    Date of Visit: 10/17/2019    Chief Complaint   Patient presents with     Hyperlipidemia     fasting     COPD       Subjective: Hypertension with COPD and hyperlipidemia.    Questions regarding DEXA bone density scan answered.  Patient in the remote past did have a DEXA bone density scan and she is had no long bone or vertebral body fractures she has mild dorsal thoracic kyphosis.  She pursues a diet and exercise program she enjoys walking every day.  She has 1200 mg of elemental calcium daily +1 to 2000 units of vitamin D3 as a supplement.    No blood in stool or urine no chest pain or shortness of breath.    She quit smoking about 1 year ago.  She has underlying COPD.    ROS: A comprehensive review of systems was performed and was otherwise negative    Medications:  Prior to Admission medications    Medication Sig Start Date End Date Taking? Authorizing Provider   albuterol (PROVENTIL) 2.5 mg /3 mL (0.083 %) nebulizer solution INHALE 1 VIAL VIA NEBULIZER EVERY 6 HOURS FOR WHEEZING 9/9/18  Yes Darius Mendoza MD   budesonide-formoterol (SYMBICORT) 160-4.5 mcg/actuation inhaler Inhale 2 puffs 2 (two) times a day. 2/11/19  Yes Darius Mendoza MD   calcium carbonate-vitamin D2 500 mg(1,250mg) -200 unit tablet Take 1 tablet by mouth daily.   Yes Darius Mendoza MD   cholecalciferol, vitamin D3, (VITAMIN D3) 5,000 unit Tab Take by mouth.   Yes PROVIDER, HISTORICAL   citalopram (CELEXA) 20 MG tablet TAKE ONE TABLET BY MOUTH ONE TIME DAILY 12/10/18  Yes Darius Mendoza MD   docusate sodium (COLACE) 100 MG capsule Take 100 mg by mouth 2 (two) times a day.   Yes PROVIDER, HISTORICAL   INHALER, ASSIST DEVICES (AEROCHAMBER PLUS Z STAT MISC) Use As Directed.   Yes Darius Mendoza MD   ipratropium (ATROVENT) 0.02 % nebulizer solution INHALE ONE VIAL VIA NEBULIZER EVERY SIX TO EIGHT HOURS AS DIRECTED 10/8/18  Yes Darius Mendoza MD    ipratropium-albuterol (COMBIVENT RESPIMAT)  mcg/actuation Mist inhaler INHALE ONE PUFF BY MOUTH FOUR TIMES DAILY 6/18/19  Yes Darius Mendoza MD   Lactobacillus rhamnosus GG (CULTURELLE) 15 billion cell CpSP Take 1 capsule by mouth 2 (two) times a day with meals.   Yes PROVIDER, HISTORICAL   multivitamin with minerals (THERA-M) 9 mg iron-400 mcg Tab tablet Take 1 tablet by mouth at bedtime.   Yes PROVIDER, HISTORICAL   psyllium (METAMUCIL) 3.4 gram packet Take 1 packet by mouth daily.   Yes PROVIDER, HISTORICAL   simvastatin (ZOCOR) 20 MG tablet TAKE ONE TABLET BY MOUTH ONE TIME DAILY 5/12/19  Yes Darius Mendoza MD   cyclobenzaprine (FLEXERIL) 5 MG tablet Take 1 tablet (5 mg total) by mouth 2 (two) times a day as needed for muscle spasms. 2/17/19   Darius Mendoza MD   predniSONE (DELTASONE) 20 MG tablet TAKE 2 TABLETS (40 MG TOTAL) BY MOUTH DAILY WITH BREAKFAST FOR 4 DAYS.  Patient taking differently: No sig reported 10/29/18   Darius Mendoza MD       Allergies: No Known Allergies    Immunizations:   Immunization History   Administered Date(s) Administered     DT (pediatric) 01/06/2005     Influenza high dose,seasonal,PF, 65+ yrs 10/08/2015, 10/24/2016, 10/03/2017, 10/09/2018, 09/23/2019     Influenza, inj, historic,unspecified 11/20/2007, 10/23/2008, 10/19/2010, 10/20/2011     Influenza, seasonal,quad inj 6-35 mos 10/09/2012, 11/15/2013, 10/29/2014     Pneumo Conj 13-V (2010&after) 07/28/2015     Pneumo Polysac 23-V 01/01/2002, 06/22/2010     Td,adult,historic,unspecified 01/06/2005     Tdap 01/27/2015       Exam Chest clear to auscultation and percussion.  Heart tones regular rhythm without murmur rub or gallop.  Abdomen soft nontender no organomegaly.  No peritoneal signs.  Extremities free of edema cyanosis or clubbing.  Neck veins nondistended no thyromegaly or scleral icterus noted, carotids full.  Skin warm and dry easily conversant good spirited.  Normal intelligence.   Neurologically intact no gross localizing findings.    130/74 pulse 63 respirations 18 O2 sats 97%.    Assessment and Plan  Hypertension observe for now check lipid panel today.    COPD off smoking has received flu vaccine as well.  Continue all inhalers and treatments same.  Patient is off smoking.  Immunizations for this season up-to-date.    Hyperlipidemia on statin therapy no history of MI or stroke.    Time: total time spent with the patient was 25 minutes of which >50% was spent in counseling and coordination of care    The following high BMI interventions were performed this visit: encouragement to exercise return to clinic in 3 months time.    Darius Mendoza MD    Patient Active Problem List   Diagnosis     Osteopenia     Chronic Major Depression     Hyperlipidemia     Chronic Bronchitis With Acute Exacerbation     Shingles     COPD (chronic obstructive pulmonary disease) (H)     Hypoxia     Depression     Tobacco abuse

## 2021-06-03 VITALS — HEIGHT: 65 IN | WEIGHT: 137.12 LBS | BODY MASS INDEX: 22.85 KG/M2

## 2021-06-03 VITALS
HEART RATE: 63 BPM | HEIGHT: 65 IN | DIASTOLIC BLOOD PRESSURE: 74 MMHG | OXYGEN SATURATION: 97 % | WEIGHT: 137 LBS | BODY MASS INDEX: 22.82 KG/M2 | SYSTOLIC BLOOD PRESSURE: 130 MMHG

## 2021-06-04 VITALS
HEIGHT: 64 IN | TEMPERATURE: 97 F | BODY MASS INDEX: 23.22 KG/M2 | WEIGHT: 136 LBS | OXYGEN SATURATION: 95 % | SYSTOLIC BLOOD PRESSURE: 124 MMHG | DIASTOLIC BLOOD PRESSURE: 70 MMHG | HEART RATE: 78 BPM

## 2021-06-04 VITALS
BODY MASS INDEX: 22.66 KG/M2 | HEART RATE: 61 BPM | DIASTOLIC BLOOD PRESSURE: 76 MMHG | OXYGEN SATURATION: 98 % | HEIGHT: 65 IN | SYSTOLIC BLOOD PRESSURE: 134 MMHG | WEIGHT: 136 LBS

## 2021-06-04 NOTE — TELEPHONE ENCOUNTER
Refill Approved    Rx renewed per Medication Renewal Policy. Medication was last renewed on 12/10/18.    Carito Mckeon, [facility] Triage/Med Refill 12/8/2019     Requested Prescriptions   Pending Prescriptions Disp Refills     citalopram (CELEXA) 20 MG tablet [Pharmacy Med Name: CITALOPRAM HBR 20 MG TABLET] 90 tablet 3     Sig: TAKE ONE TABLET BY MOUTH ONE TIME DAILY       [facility] Refill Protocol  Passed - 12/8/2019  9:17 AM        Passed - PCP or prescribing provider visit in last year     Last office visit with prescriber/PCP: 10/17/2019 Darius Mendoza MD OR same dept: 10/17/2019 Darius Mendoza MD OR same specialty: 10/17/2019 Darius Mendoza MD  Last physical: Visit date not found Last MTM visit: Visit date not found   Next visit within 3 mo: Visit date not found  Next physical within 3 mo: Visit date not found  Prescriber OR PCP: Darius Mendoza MD  Last diagnosis associated with med order: There are no diagnoses linked to this encounter.  If protocol passes may refill for 12 months if within 3 months of last provider visit (or a total of 15 months).

## 2021-06-04 NOTE — TELEPHONE ENCOUNTER
Refill Approved    Rx renewed per Medication Renewal Policy. Medication was last renewed on 9/9/18.    Chantel Lennon, [facility] Triage/Med Refill 12/16/2019     Requested Prescriptions   Pending Prescriptions Disp Refills     albuterol (PROVENTIL) 2.5 mg /3 mL (0.083 %) nebulizer solution [Pharmacy Med Name: ALBUTEROL SUL 2.5 MG/3 ML SOLN] 270 mL 1     Sig: INHALE 1 VIAL VIA NEBULIZER EVERY 6 HOURS FOR WHEEZING       Albuterol/Levalbuterol Refill Protocol Passed - 12/15/2019 10:44 AM        Passed - PCP or prescribing provider visit in last year     Last office visit with prescriber/PCP: 10/17/2019 Darius Mendoza MD OR same dept: 10/17/2019 Darius Mendoza MD OR same specialty: 10/17/2019 Darius Mendoza MD Last physical: Visit date not found       Next appt within 3 mo: Visit date not found  Next physical within 3 mo: Visit date not found  Prescriber OR PCP: Darius Mendoza MD  Last diagnosis associated with med order: There are no diagnoses linked to this encounter.  If protocol passes may refill for 6 months if within 3 months of last provider visit (or a total of 9 months). If patient requesting >1 inhaler per month refill x 6 months and have patient make appointment with provider.

## 2021-06-05 VITALS
HEART RATE: 68 BPM | BODY MASS INDEX: 23.39 KG/M2 | WEIGHT: 137 LBS | DIASTOLIC BLOOD PRESSURE: 72 MMHG | SYSTOLIC BLOOD PRESSURE: 142 MMHG | HEIGHT: 64 IN | OXYGEN SATURATION: 96 % | TEMPERATURE: 97.2 F

## 2021-06-05 VITALS
HEART RATE: 64 BPM | WEIGHT: 136 LBS | TEMPERATURE: 97.4 F | HEIGHT: 64 IN | SYSTOLIC BLOOD PRESSURE: 134 MMHG | OXYGEN SATURATION: 98 % | BODY MASS INDEX: 23.22 KG/M2 | DIASTOLIC BLOOD PRESSURE: 70 MMHG

## 2021-06-05 VITALS
TEMPERATURE: 97.3 F | SYSTOLIC BLOOD PRESSURE: 146 MMHG | BODY MASS INDEX: 23.05 KG/M2 | HEART RATE: 72 BPM | WEIGHT: 135 LBS | DIASTOLIC BLOOD PRESSURE: 80 MMHG | HEIGHT: 64 IN

## 2021-06-05 VITALS
HEART RATE: 75 BPM | WEIGHT: 135 LBS | TEMPERATURE: 96.8 F | OXYGEN SATURATION: 99 % | BODY MASS INDEX: 23.05 KG/M2 | DIASTOLIC BLOOD PRESSURE: 76 MMHG | SYSTOLIC BLOOD PRESSURE: 136 MMHG | HEIGHT: 64 IN

## 2021-06-05 NOTE — TELEPHONE ENCOUNTER
Refill Approved    Rx renewed per Medication Renewal Policy. Medication was last renewed on 10/8/18.    Chantel Lennon, [facility] Triage/Med Refill 1/20/2020     Requested Prescriptions   Pending Prescriptions Disp Refills     ipratropium (ATROVENT) 0.02 % nebulizer solution [Pharmacy Med Name: IPRATROPIUM BR 0.02% SOLN] 250 mL 0     Sig: INHALE ONE VIAL VIA NEBULIZER EVERY SIX TO EIGHT HOURS AS DIRECTED       Ipratropium/Tiotropium/Umeclidinium Refill Protocol Passed - 1/19/2020  9:39 AM        Passed - PCP or prescribing provider visit in last 6 months     Last office visit with prescriber/PCP: 10/17/2019 OR same dept: 10/17/2019 Darius Mendoza MD OR same specialty: 10/17/2019 Darius Mendoza MD Last physical: Visit date not found Last MTM visit: Visit date not found     Next appt within 3 mo: Visit date not found  Next physical within 3 mo: Visit date not found  Prescriber OR PCP: Darius Mendoza MD  Last diagnosis associated with med order: There are no diagnoses linked to this encounter.  If protocol passes may refill for 6 months if within 3 months of last provider visit (or a total of 9 months).

## 2021-06-06 NOTE — PROGRESS NOTES
Office Visit - Follow up    Alpa Ty   79 y.o. female    Date of Visit: 2/18/2020    Chief Complaint   Patient presents with     COPD     Hyperlipidemia       Subjective: Hypertension.    Hyperlipidemia and COPD.    The patient is a non-smoker.  She is fasting for today's appointment she denies any chest pain shortness of breath no blood in stool or urine medication list reviewed well-tolerated normal fax reconciled in the chart no known drug allergies.    Rare alcohol patient did spend a month in [place] last.  She is a non-smoker for about 60 years.    ROS: A comprehensive review of systems was performed and was otherwise negative    Medications:  Prior to Admission medications    Medication Sig Start Date End Date Taking? Authorizing Provider   albuterol (PROVENTIL) 2.5 mg /3 mL (0.083 %) nebulizer solution INHALE 1 VIAL VIA NEBULIZER EVERY 6 HOURS FOR WHEEZING 12/16/19  Yes Darius Mendoza MD   budesonide-formoterol (SYMBICORT) 160-4.5 mcg/actuation inhaler Inhale 2 puffs 2 (two) times a day. 2/11/19  Yes Darius Mendoza MD   calcium carbonate-vitamin D2 500 mg(1,250mg) -200 unit tablet Take 1 tablet by mouth daily.   Yes Darius Mendoza MD   cholecalciferol, vitamin D3, (VITAMIN D3) 5,000 unit Tab Take by mouth.   Yes PROVIDER, HISTORICAL   citalopram (CELEXA) 20 MG tablet Take 1 tablet (20 mg total) by mouth daily. 12/8/19  Yes Darius Mendoza MD   docusate sodium (COLACE) 100 MG capsule Take 100 mg by mouth 2 (two) times a day.   Yes PROVIDER, HISTORICAL   ipratropium (ATROVENT) 0.02 % nebulizer solution INHALE ONE VIAL VIA NEBULIZER EVERY SIX TO EIGHT HOURS AS DIRECTED 1/20/20  Yes Darius Mendoza MD   ipratropium-albuteroL (COMBIVENT RESPIMAT)  mcg/actuation Mist inhaler INHALE ONE PUFF BY MOUTH FOUR TIMES DAILY 2/18/20  Yes Darius Mendoza MD   Lactobacillus rhamnosus GG (CULTURELLE) 15 billion cell CpSP Take 1 capsule by mouth 2 (two) times a day with meals.   Yes  PROVIDER, HISTORICAL   multivitamin with minerals (THERA-M) 9 mg iron-400 mcg Tab tablet Take 1 tablet by mouth at bedtime.   Yes PROVIDER, HISTORICAL   psyllium (METAMUCIL) 3.4 gram packet Take 1 packet by mouth daily.   Yes PROVIDER, HISTORICAL   simvastatin (ZOCOR) 20 MG tablet TAKE ONE TABLET BY MOUTH ONE TIME DAILY 2/13/20  Yes Darius Mendoza MD   ipratropium-albuterol (COMBIVENT RESPIMAT)  mcg/actuation Mist inhaler INHALE ONE PUFF BY MOUTH FOUR TIMES DAILY 6/18/19 2/18/20 Yes Darius Mendoza MD   cyclobenzaprine (FLEXERIL) 5 MG tablet Take 1 tablet (5 mg total) by mouth 2 (two) times a day as needed for muscle spasms. 2/17/19   Darius Mendoza MD   INHALER, ASSIST DEVICES (AEROCHAMBER PLUS Z STAT MISC) Use As Directed.    Darius Mendoza MD   predniSONE (DELTASONE) 20 MG tablet TAKE 2 TABLETS (40 MG TOTAL) BY MOUTH DAILY WITH BREAKFAST FOR 4 DAYS.  Patient taking differently: No sig reported 10/29/18   Darius Mendoza MD   varenicline (CHANTIX) 1 mg tablet Take 1 tablet (1 mg total) by mouth daily. 2/18/20   Darius Mendoza MD       Allergies: No Known Allergies    Immunizations:   Immunization History   Administered Date(s) Administered     DT (pediatric) 01/06/2005     Influenza high dose,seasonal,PF, 65+ yrs 10/08/2015, 10/24/2016, 10/03/2017, 10/09/2018, 09/23/2019     Influenza, inj, historic,unspecified 11/20/2007, 10/23/2008, 10/19/2010, 10/20/2011     Influenza, seasonal,quad inj 6-35 mos 10/09/2012, 11/15/2013, 10/29/2014     Pneumo Conj 13-V (2010&after) 07/28/2015     Pneumo Polysac 23-V 01/01/2002, 06/22/2010     Td,adult,historic,unspecified 01/06/2005     Tdap 01/27/2015       Exam Chest clear to auscultation and percussion.  Heart tones regular rhythm without murmur rub or gallop.  Abdomen soft nontender no organomegaly.  No peritoneal signs.  Extremities free of edema cyanosis or clubbing.  Neck veins nondistended no thyromegaly or scleral icterus noted,  carotids full.  Skin warm and dry easily conversant good spirited.  Normal intelligence.  Neurologically intact no gross localizing findings.    156/76 recheck 150/72 later 134/76.  Pulse 60 regular respirations 18 O2 sats 98%.    Assessment and Plan  Hypertension labile.  Salt restriction advised.  Otherwise stable check lipid panel today.    COPD off smoking.  Stable less short of breath encouraged low-salt diet.  Continue inhalers same.  Annual flu vaccine advised.  Hyperlipidemia on statin therapy no history of MI or stroke no history of xanthoma or xanthelasma.  Continue simvastatin same.  20 mg daily.    Time: total time spent with the patient was 25 minutes of which >50% was spent in counseling and coordination of care    The following high BMI interventions were performed this visit: encouragement to exercise    Darius Mendoza MD    Patient Active Problem List   Diagnosis     Osteopenia     Chronic Major Depression     Hyperlipidemia     Chronic Bronchitis With Acute Exacerbation     Shingles     COPD (chronic obstructive pulmonary disease) (H)     Hypoxia     Depression     Tobacco abuse

## 2021-06-06 NOTE — TELEPHONE ENCOUNTER
Refill Given    Refill given per Policy, patient informed they are overdue for Office Visit due for 3 month follow up Jan 2020    Jacquelin Conti, [facility] Triage/Med Refill 2/13/2020    Requested Prescriptions   Pending Prescriptions Disp Refills     simvastatin (ZOCOR) 20 MG tablet [Pharmacy Med Name: SIMVASTATIN 20 MG TABLET] 90 tablet 2     Sig: TAKE ONE TABLET BY MOUTH ONE TIME DAILY       Statins Refill Protocol (Hmg CoA Reductase Inhibitors) Passed - 2/9/2020  9:01 AM        Passed - PCP or prescribing provider visit in past 12 months      Last office visit with prescriber/PCP: 10/17/2019 Darius Mendoza MD OR same dept: 10/17/2019 Darius Mendoza MD OR same specialty: 10/17/2019 Darius Mendoza MD  Last physical: Visit date not found Last MTM visit: Visit date not found   Next visit within 3 mo: Visit date not found  Next physical within 3 mo: Visit date not found  Prescriber OR PCP: Darius Mendoza MD  Last diagnosis associated with med order: 1. HTN (hypertension)  - simvastatin (ZOCOR) 20 MG tablet [Pharmacy Med Name: SIMVASTATIN 20 MG TABLET]; TAKE ONE TABLET BY MOUTH ONE TIME DAILY  Dispense: 90 tablet; Refill: 2    If protocol passes may refill for 12 months if within 3 months of last provider visit (or a total of 15 months).

## 2021-06-08 NOTE — TELEPHONE ENCOUNTER
Refill Approved    Rx renewed per Medication Renewal Policy. Medication was last renewed on 2/13/20.    Chantel Lennon, [facility] Triage/Med Refill 5/19/2020     Requested Prescriptions   Pending Prescriptions Disp Refills     simvastatin (ZOCOR) 20 MG tablet [Pharmacy Med Name: SIMVASTATIN 20 MG TABLET] 90 tablet 0     Sig: TAKE 1 TABLET BY MOUTH EVERY DAY       Statins Refill Protocol (Hmg CoA Reductase Inhibitors) Passed - 5/17/2020  9:08 AM        Passed - PCP or prescribing provider visit in past 12 months      Last office visit with prescriber/PCP: 2/18/2020 Darius Mendoza MD OR same dept: 2/18/2020 Darius Mendoza MD OR same specialty: 2/18/2020 Darius Mendoza MD  Last physical: Visit date not found Last MTM visit: Visit date not found   Next visit within 3 mo: Visit date not found  Next physical within 3 mo: Visit date not found  Prescriber OR PCP: Darius Mendoza MD  Last diagnosis associated with med order: 1. HTN (hypertension)  - simvastatin (ZOCOR) 20 MG tablet [Pharmacy Med Name: SIMVASTATIN 20 MG TABLET]; TAKE 1 TABLET BY MOUTH EVERY DAY  Dispense: 90 tablet; Refill: 0    If protocol passes may refill for 12 months if within 3 months of last provider visit (or a total of 15 months).

## 2021-06-09 NOTE — TELEPHONE ENCOUNTER
RN cannot approve Refill Request    RN can NOT refill this medication PCP messaged that patient is overdue for Labs. Last office visit: 2/18/2020 Darius Menodza MD Last Physical: Visit date not found Last MTM visit: Visit date not found Last visit same specialty: 2/18/2020 Darius Mendoza MD.  Next visit within 3 mo: Visit date not found  Next physical within 3 mo: Visit date not found      Rose Marie Reyna, [facility] Triage/Med Refill 6/21/2020    Requested Prescriptions   Pending Prescriptions Disp Refills     CHANTIX 1 mg tablet [Pharmacy Med Name: CHANTIX 1 MG TABLET] 90 tablet 1     Sig: TAKE 1 TABLET BY MOUTH EVERY DAY       [facility] Refill Protocol Failed - 6/21/2020  9:15 AM        Failed - Normal Serum Creatinine in past 12 months      Creatinine   Date Value Ref Range Status   09/11/2017 0.75 0.60 - 1.10 mg/dL Final             Passed - PCP or prescribing provider visit in last 12 or next 3 months.     Last office visit with prescriber/PCP: 2/18/2020 Darius Mendoza MD OR same dept: 2/18/2020 Darius Mendoza MD  Last physical: Visit date not found       Next visit within 3 mo: Visit date not found  Next physical within 3 mo: Visit date not found  Prescriber OR PCP: Darius Mendoza MD  Last diagnosis associated with med order: 1. Essential hypertension  - CHANTIX 1 mg tablet [Pharmacy Med Name: CHANTIX 1 MG TABLET]; TAKE 1 TABLET BY MOUTH EVERY DAY  Dispense: 90 tablet; Refill: 1     Requested Prescriptions     Pending Prescriptions Disp Refills     CHANTIX 1 mg tablet [Pharmacy Med Name: CHANTIX 1 MG TABLET] 90 tablet 1     Sig: TAKE 1 TABLET BY MOUTH EVERY DAY     May refill for 3 months if protocol passes.

## 2021-06-09 NOTE — PROGRESS NOTES
Office Visit - Follow up    Alpa Ty   76 y.o. female    Date of Visit: 3/3/2017    Chief Complaint   Patient presents with     Hyperlipidemia     fasting       Subjective: Follow-up hyperlipidemia and COPD.    Lipids are pending last done July 18, 2016.    Follow-up depression PHQ 9 score measured 2 today.    Patient continues to smoke despite our strongest recommendation to stop smoking not a pack per day.    Medication list reviewed generally well-tolerated.  The patient has no suicidal ideations on citalopram 20 mg daily.    No blood in stool or urine the patient denies chest pain or shortness of breath.    Last mammogram all clear October 16, 2013.    ROS: A comprehensive review of systems was performed and was otherwise negative    Medications:  Prior to Admission medications    Medication Sig Start Date End Date Taking? Authorizing Provider   albuterol (PROVENTIL) 2.5 mg /3 mL (0.083 %) nebulizer solution INHALE 1 VIAL VIA NEBULIZER EVERY 6 HOURS FOR WHEEZING 1/29/17  Yes Darius Mendoza MD   budesonide-formoterol (SYMBICORT) 160-4.5 mcg/actuation inhaler Inhale 2 puffs. 12/30/16  Yes PROVIDER, HISTORICAL   calcium carbonate-vitamin D2 500 mg(1,250mg) -200 unit tablet Take 1 tablet by mouth daily.   Yes Darius Mendoza MD   cholecalciferol, vitamin D3, (VITAMIN D3) 1,000 unit capsule Take 1,000 Units by mouth daily.   Yes PROVIDER, HISTORICAL   citalopram (CELEXA) 20 MG tablet TAKE ONE TABLET BY MOUTH ONE TIME DAILY 12/27/16  Yes Darius Mendoza MD   COMBIVENT RESPIMAT  mcg/actuation Mist inhaler Inhale one puff by mouth four times daily 1/5/15  Yes Darius Mendoza MD   ipratropium (ATROVENT) 0.02 % nebulizer solution INHALE ONE VIAL VIA NEBULIZER EVERY SIX TO EIGHT HOURS AS DIRECTED 12/5/16  Yes Darius Mendoza MD   simvastatin (ZOCOR) 20 MG tablet TAKE ONE TABLET BY MOUTH ONE TIME DAILY 5/30/16  Yes Darius Mendoza MD   DENTA 5000 PLUS 1.1 % Crea USE TO BRUSH IN THE  MORNING AND EVENING. SPIT OUT AND DO NOT RINSE 8/21/16   PROVIDER, HISTORICAL   INHALER, ASSIST DEVICES (AEROCHAMBER PLUS Z STAT MISC) Use As Directed.    Darius Mendoza MD   fluticasone-salmeterol (ADVAIR DISKUS) 250-50 mcg/dose DISKUS Inhale 1 puff daily. 5/16/16 3/3/17  Darius Mendoza MD       Allergies: No Known Allergies    Immunizations:   Immunization History   Administered Date(s) Administered     DT (pediatric) 01/06/2005     Influenza high dose, seasonal 10/08/2015, 10/24/2016     Influenza, inj, historic 11/20/2007, 10/23/2008, 10/19/2010, 10/20/2011     Influenza, seasonal,quad inj 6-35 mos 10/09/2012, 11/15/2013, 10/29/2014     Pneumo Conj 13-V (2010&after) 07/28/2015     Pneumo Polysac 23-V 01/01/2002, 06/22/2010     Td, historic 01/06/2005     Tdap 01/27/2015       Exam Chest clear to auscultation and percussion.  Heart tones regular rhythm without murmur rub or gallop.  Abdomen soft nontender no organomegaly.  No peritoneal signs.  Extremities free of edema cyanosis or clubbing.  Neck veins nondistended no thyromegaly or scleral icterus noted, carotids full.  Skin warm and dry easily conversant good spirited.  Normal intelligence.  Neurologically intact no gross localizing findings.    Assessment and Plan  COPD stop smoking advised.  Check lipid panel today.    Hyperlipidemia on statin therapy.    Depression on citalopram no suicidal ideations PHQ 9 score measured 2 today.    Time: total time spent with the patient was 25 minutes of which >50% was spent in counseling and coordination of care    The following high BMI interventions were performed this visit: encouragement to exercise    Darius Mendoza MD    Patient Active Problem List   Diagnosis     Osteopenia     Chronic Major Depression     Hyperlipidemia     Chronic Bronchitis With Acute Exacerbation     Shingles

## 2021-06-10 NOTE — PROGRESS NOTES
Assessment and Plan:   Annual wellness visit    1. Routine general medical examination at a health care facility  Annual wellness visit  - [facility](CBC w/o Differential)  - Comprehensive Metabolic Panel  - Lipid Cascade  - Thyroid Stimulating Hormone (TSH)  - Urinalysis-UC if Indicated     The patient's current medical problems were reviewed.    I have had an Advance Directives discussion with the patient.  The following health maintenance schedule was reviewed with the patient and provided in printed form in the after visit summary:   Health Maintenance   Topic Date Due     DEPRESSION ACTION PLAN  1940     SPIROMETRY  1940     COPD ACTION PLAN  1940     ZOSTER VACCINES (1 of 2) 1990     MEDICARE ANNUAL WELLNESS VISIT  2005     DXA SCAN  2005     FALL RISK ASSESSMENT  2020     INFLUENZA VACCINE RULE BASED (1) 2020     ADVANCE CARE PLANNING  2022     TD 18+ HE  2025     LIPID  2025     PNEUMOCOCCAL IMMUNIZATION 65+ LOW/MEDIUM RISK  Completed     HEPATITIS B VACCINES  Aged Out        Subjective:   Chief Complaint: Alpa Ty is an 79 y.o. female here for an Annual Wellness visit.   HPI: Annual wellness visit physical exam for this 79-year-old female.  Fasting.    Right groin bothers no bulge.  Offered x-ray right hip patient declined.  Worse when she climbs stairs.  She walks 2-1/2 miles per day.  Lives in [facility].    Currently a non-smoker but had smoked till age 78.  No alcohol.  No known drug allergies.  Only prior operation is ovarian cyst.    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    Hyperlipidemia.  History of depression not depressed.  Does not feel anxious no suicidal ideations.    Mother  pneumonia 38.    Father  87 uncertain cause.  1 significant other for children 4 grandchildren.   multiple times.    Last mammogram allCLE2013 never had  colonoscopy.    Review of Systems:    Please see above.  The rest of the review of systems are negative for all systems.    Patient Care Team:  Darius Mendoza MD as PCP - General  Darius Mendoza MD as Assigned PCP     Patient Active Problem List   Diagnosis     Osteopenia     Chronic Major Depression     Hyperlipidemia     Chronic Bronchitis With Acute Exacerbation     Shingles     COPD (chronic obstructive pulmonary disease) (H)     Hypoxia     Depression     Tobacco abuse     Past Medical History:   Diagnosis Date     Chronic bronchitis with acute exacerbation (H)      COPD (chronic obstructive pulmonary disease) (H)      Hyperlipidemia       No past surgical history on file.   No family history on file.   Social History     Socioeconomic History     Marital status:      Spouse name: Not on file     Number of children: Not on file     Years of education: Not on file     Highest education level: Not on file   Occupational History     Not on file   Social Needs     Financial resource strain: Not on file     Food insecurity     Worry: Not on file     Inability: Not on file     Transportation needs     Medical: Not on file     Non-medical: Not on file   Tobacco Use     Smoking status: Former Smoker     Types: Cigarettes     Smokeless tobacco: Never Used     Tobacco comment: stopped  june 2018   Substance and Sexual Activity     Alcohol use: No     Drug use: No     Sexual activity: Not on file   Lifestyle     Physical activity     Days per week: Not on file     Minutes per session: Not on file     Stress: Not on file   Relationships     Social connections     Talks on phone: Not on file     Gets together: Not on file     Attends [employer]ism service: Not on file     Active member of club or organization: Not on file     Attends meetings of clubs or organizations: Not on file     Relationship status: Not on file     Intimate partner violence     Fear of current or ex partner: Not on file     Emotionally  abused: Not on file     Physically abused: Not on file     Forced sexual activity: Not on file   Other Topics Concern     Not on file   Social History Narrative     Not on file      Current Outpatient Medications   Medication Sig Dispense Refill     albuterol (PROVENTIL) 2.5 mg /3 mL (0.083 %) nebulizer solution INHALE 1 VIAL VIA NEBULIZER EVERY 6 HOURS FOR WHEEZING 270 mL 1     calcium carbonate-vitamin D2 500 mg(1,250mg) -200 unit tablet Take 1 tablet by mouth daily.       CHANTIX 1 mg tablet TAKE 1 TABLET BY MOUTH EVERY DAY 90 tablet 1     cholecalciferol, vitamin D3, (VITAMIN D3) 5,000 unit Tab Take by mouth.       citalopram (CELEXA) 20 MG tablet Take 1 tablet (20 mg total) by mouth daily. 90 tablet 3     cyclobenzaprine (FLEXERIL) 5 MG tablet Take 1 tablet (5 mg total) by mouth 2 (two) times a day as needed for muscle spasms. 20 tablet 1     docusate sodium (COLACE) 100 MG capsule Take 100 mg by mouth 2 (two) times a day.       INHALER, ASSIST DEVICES (AEROCHAMBER PLUS Z STAT MISC) Use As Directed.       ipratropium (ATROVENT) 0.02 % nebulizer solution INHALE ONE VIAL VIA NEBULIZER EVERY SIX TO EIGHT HOURS AS DIRECTED 250 mL 2     ipratropium-albuteroL (COMBIVENT RESPIMAT)  mcg/actuation Mist inhaler INHALE ONE PUFF BY MOUTH FOUR TIMES DAILY 3 Inhaler 3     Lactobacillus rhamnosus GG (CULTURELLE) 15 billion cell CpSP Take 1 capsule by mouth 2 (two) times a day with meals.       multivitamin with minerals (THERA-M) 9 mg iron-400 mcg Tab tablet Take 1 tablet by mouth at bedtime.       predniSONE (DELTASONE) 20 MG tablet TAKE 2 TABLETS (40 MG TOTAL) BY MOUTH DAILY WITH BREAKFAST FOR 4 DAYS. (Patient taking differently: No sig reported) 8 tablet 0     psyllium (METAMUCIL) 3.4 gram packet Take 1 packet by mouth daily.       simvastatin (ZOCOR) 20 MG tablet TAKE 1 TABLET BY MOUTH EVERY DAY 90 tablet 2     SYMBICORT 160-4.5 mcg/actuation inhaler INHALE 2 PUFFS BY MOUTH TWICE A DAY 30.6 Inhaler 2     No current  facility-administered medications for this visit.       Objective:   Vital Signs:   Visit Vitals  LMP  (LMP Unknown)          VisionScreening:  No exam data present     PHYSICAL EXAM  Chest clear to auscultation and percussion.  Heart tones regular rhythm without murmur rub or gallop.  Abdomen soft nontender no organomegaly.  No peritoneal signs.  Extremities free of edema cyanosis or clubbing.  Neck veins nondistended no thyromegaly or scleral icterus noted, carotids full.  Skin warm and dry easily conversant good spirited.  Normal intelligence.  Neurologically intact no gross localizing findings.  Stiff exam negative.  124/70 pulse 78 respirations 18 O2 sats 95% temperature this morning 97.0  F.  Weight stable 136 pounds she appeared well not in acute distress depressed respiratory sounds no rales rhonchi or wheezes.  BMI is 23.53.  Neatly attired easily conversant and good spirited humerus.    No flowsheet data found.  A Mini-Cog score of 0-2 suggests the possibility of dementia, score of 3-5 suggests no dementia        Identified Health Risks:

## 2021-06-10 NOTE — PATIENT INSTRUCTIONS - HE
Patient Education   Personalized Prevention Plan  You are due for the preventive services outlined below.  Your care team is available to assist you in scheduling these services.  If you have already completed any of these items, please share that information with your care team to update in your medical record.  Health Maintenance   Topic Date Due     DEPRESSION ACTION PLAN  1940     SPIROMETRY  1940     COPD ACTION PLAN  1940     ZOSTER VACCINES (1 of 2) 12/13/1990     MEDICARE ANNUAL WELLNESS VISIT  12/13/2005     DXA SCAN  12/13/2005     FALL RISK ASSESSMENT  07/02/2020     INFLUENZA VACCINE RULE BASED (1) 08/01/2020     ADVANCE CARE PLANNING  03/03/2022     TD 18+ HE  01/27/2025     LIPID  02/18/2025     PNEUMOCOCCAL IMMUNIZATION 65+ LOW/MEDIUM RISK  Completed     HEPATITIS B VACCINES  Aged Out

## 2021-06-11 NOTE — PROGRESS NOTES
Office Visit - Follow up    Alpa Ty   76 y.o. female    Date of Visit: 7/3/2017    Chief Complaint   Patient presents with     COPD     Follow-up       Subjective: Hypertension COPD with asthma.    Patient feels well offers no new complaints history of hyperlipidemia as well and blood pressure was checked with last visit along with lipid panel.  Not fasting for today's appointment.    Breathing easy has lost more weight with weight loss her exercise tolerance is increased.  She walked 90 minutes this morning without chest pain shortness of breath cough or exertional syncope or palpitations.    Medication list reviewed generally well-tolerated.    No blood in stool or urine or sputum    ROS: A comprehensive review of systems was performed and was otherwise negative    Medications:  Prior to Admission medications    Medication Sig Start Date End Date Taking? Authorizing Provider   albuterol (PROVENTIL) 2.5 mg /3 mL (0.083 %) nebulizer solution INHALE 1 VIAL VIA NEBULIZER EVERY 6 HOURS FOR WHEEZING 1/29/17  Yes Darius Mendoza MD   budesonide-formoterol (SYMBICORT) 160-4.5 mcg/actuation inhaler Inhale 2 puffs. 12/30/16  Yes PROVIDER, HISTORICAL   calcium carbonate-vitamin D2 500 mg(1,250mg) -200 unit tablet Take 1 tablet by mouth daily.   Yes Darius Mendoza MD   cholecalciferol, vitamin D3, (VITAMIN D3) 1,000 unit capsule Take 1,000 Units by mouth daily.   Yes PROVIDER, HISTORICAL   citalopram (CELEXA) 20 MG tablet TAKE ONE TABLET BY MOUTH ONE TIME DAILY 12/27/16  Yes Darius Mendoza MD   DENTA 5000 PLUS 1.1 % Crea USE TO BRUSH IN THE MORNING AND EVENING. SPIT OUT AND DO NOT RINSE 8/21/16  Yes PROVIDER, HISTORICAL   INHALER, ASSIST DEVICES (AEROCHAMBER PLUS Z STAT MISC) Use As Directed.   Yes Darius Mendoza MD   ipratropium (ATROVENT) 0.02 % nebulizer solution INHALE ONE VIAL VIA NEBULIZER EVERY SIX TO EIGHT HOURS AS DIRECTED 12/5/16  Yes Darius Mendoza MD   ipratropium-albuterol  (COMBIVENT RESPIMAT)  mcg/actuation Mist inhaler INHALE ONE PUFF BY MOUTH FOUR TIMES DAILY 5/15/17  Yes Darius Mendoza MD   simvastatin (ZOCOR) 20 MG tablet TAKE ONE TABLET BY MOUTH ONE TIME DAILY 5/23/17  Yes Darius Mendoza MD       Allergies: No Known Allergies    Immunizations:   Immunization History   Administered Date(s) Administered     DT (pediatric) 01/06/2005     Influenza high dose, seasonal 10/08/2015, 10/24/2016     Influenza, inj, historic 11/20/2007, 10/23/2008, 10/19/2010, 10/20/2011     Influenza, seasonal,quad inj 6-35 mos 10/09/2012, 11/15/2013, 10/29/2014     Pneumo Conj 13-V (2010&after) 07/28/2015     Pneumo Polysac 23-V 01/01/2002, 06/22/2010     Td, historic 01/06/2005     Tdap 01/27/2015       Exam Chest clear to auscultation and percussion.  Heart tones regular rhythm without murmur rub or gallop.  Abdomen soft nontender no organomegaly.  No peritoneal signs.  Extremities free of edema cyanosis or clubbing.  Neck veins nondistended no thyromegaly or scleral icterus noted, carotids full.  Skin warm and dry easily conversant good spirited.  Normal intelligence.  Neurologically intact no gross localizing findings.  Scattered rhonchi decreased breath sounds and rales.  No wheezing.  Not in acute distress no central or acrocyanosis no tachypnea.  No clubbing.    Assessment and Plan  COPD with hypertension and hyperlipidemia.  Lipid panel with next visit in 3 months same meds and cares also flu shot then.    Time: total time spent with the patient was 25 minutes of which >50% was spent in counseling and coordination of care    The following high BMI interventions were performed this visit: encouragement to exercise    Darius Mendoza MD    Patient Active Problem List   Diagnosis     Osteopenia     Chronic Major Depression     Hyperlipidemia     Chronic Bronchitis With Acute Exacerbation     Shingles

## 2021-06-12 NOTE — TELEPHONE ENCOUNTER
Triage call:    Pt states she is having trouble sleeping due to restless legs.   States she saw Dr. Mendoza about a month ago and it wasn't bad then.   Pt wondering if she can try something OTC.   Sx began 1-2 months ago. She used to get up and walk around to help. Pt states sx seem to be worsening.   States her leg will start to jerk, no pain.  Pt states its an unpleasant feeling in the legs. States she feels it behind her knee.     Pt was advised of protocol recommendation/disposition of see PCP within 2 weeks.     RN discussed care advice in depth with patient. Pt will try care advice to see if it helps her symptoms at night and see if it helps with being able to fall asleep.  Pt to call back if her sx worsen or do not improve with care advice.    Pt agreed to plan and will schedule appointment in clinic.     Jannet Bender RN 11/02/20 1:41 PM  [facility] Nurse Advisor      COVID 19 Nurse Triage Plan/Patient Instructions    Please be aware that novel coronavirus (COVID-19) may be circulating in the community. If you develop symptoms such as fever, cough, or SOB or if you have concerns about the presence of another infection including coronavirus (COVID-19), please contact your health care provider or visit [email or web address].     Disposition/Instructions    In-Person Visit with provider recommended. Reference Visit Selection Guide.    Thank you for taking steps to prevent the spread of this virus.  o Limit your contact with others.  o Wear a simple mask to cover your cough.  o Wash your hands well and often.    Resources    [facility]: About COVID-19: [email or web address]    CDC: What to Do If You're Sick: www.cdc.gov/coronavirus/2019-ncov/about/steps-when-sick.html    CDC: Ending Home Isolation: www.cdc.gov/coronavirus/2019-ncov/hcp/disposition-in-home-patients.html     CDC: Caring for Someone: www.cdc.gov/coronavirus/2019-ncov/if-you-are-sick/care-for-someone.html     MD: Interim  "Guidance for Hospital Discharge to Home: www.health.Count includes the [facility].[place]/diseases/coronavirus/hcp/hospdischarge.pdf    [facility] clinical trials (COVID-19 research studies): [email or web address]-trials     Below are the COVID-19 hotlines at the [place] [facility] ([facility]). Interpreters are available.   o For health questions: Call 547-684-3488 or 1-830.824.2134 (7 a.m. to 7 p.m.)  o For questions about schools and childcare: Call 253-443-3610 or 1-202.814.9959 (7 a.m. to 7 p.m.)     Reason for Disposition    [1] Restless legs or unpleasant feeling in legs AND [2] causes insomnia    Insomnia is an ongoing problem (> 2 weeks)    Additional Information    Negative: Difficulty breathing    Negative: Depression is suspected    Negative: Traumatic Brain Injury (TBI) is suspected    Negative: Alcohol  abuse or dependence is suspected    Negative: Substance abuse or dependence suspected    Negative: [1] Pain is causing insomnia AND [2] pain is not a chronic symptom (recurrent or ongoing AND present > 4 weeks)    Negative: Requesting medication for sleep (\"sleeping pill\")    Negative: [1] Insomnia persists > 1 week AND [2] no improvement after using CARE ADVICE    Negative: Insomnia interferes with work or school    Negative: [1] Pain is causing insomnia AND [2] pain is a chronic symptom (recurrent or ongoing AND present > 4 weeks)    Negative: Awakened  by jerking leg movements    Negative: Loud snoring is an ongoing problem  (> 2 weeks)    Negative: Excessive daytime sleepiness is an ongoing problem  (> 2 weeks)    Protocols used: INSOMNIA-A-AH      "

## 2021-06-13 NOTE — TELEPHONE ENCOUNTER
Refill Approved    Rx renewed per Medication Renewal Policy. Medication was last renewed on 12/8/19.    Chantel Lennon, [facility] Triage/Med Refill 11/17/2020     Requested Prescriptions   Pending Prescriptions Disp Refills     citalopram (CELEXA) 20 MG tablet [Pharmacy Med Name: CITALOPRAM HBR 20 MG TABLET] 90 tablet 3     Sig: TAKE 1 TABLET BY MOUTH EVERY DAY       [facility] Refill Protocol  Passed - 11/15/2020  9:01 AM        Passed - PCP or prescribing provider visit in last year     Last office visit with prescriber/PCP: 11/13/2020 Darius Mendoza MD OR same dept: 11/13/2020 Darius Mendoza MD OR same specialty: 11/13/2020 Darius Mendoza MD  Last physical: 8/18/2020 Last MTM visit: Visit date not found   Next visit within 3 mo: Visit date not found  Next physical within 3 mo: Visit date not found  Prescriber OR PCP: Darius Mendoza MD  Last diagnosis associated with med order: 1. Major depressive disorder, recurrent episode (H)  - citalopram (CELEXA) 20 MG tablet [Pharmacy Med Name: CITALOPRAM HBR 20 MG TABLET]; TAKE 1 TABLET BY MOUTH EVERY DAY  Dispense: 90 tablet; Refill: 3    2. Depression  - citalopram (CELEXA) 20 MG tablet [Pharmacy Med Name: CITALOPRAM HBR 20 MG TABLET]; TAKE 1 TABLET BY MOUTH EVERY DAY  Dispense: 90 tablet; Refill: 3    If protocol passes may refill for 12 months if within 3 months of last provider visit (or a total of 15 months).

## 2021-06-13 NOTE — PROGRESS NOTES
Office Visit - Follow up    Alpa Ty   79 y.o. female    Date of Visit: 11/13/2020    Chief Complaint   Patient presents with     Hip Pain     right       Subjective: Osteoarthritis.    Right hip pain can climb stairs.  Some pain in the lumbar spine area as well no recent falls but did fall on her right knee.  Injure the patellar tendon.    No blood in stool or urine denies chest pain shortness of breath.  Medication list reviewed well-tolerated normal effects reconciled.    ROS: A comprehensive review of systems was performed and was otherwise negative    Medications:  Prior to Admission medications    Medication Sig Start Date End Date Taking? Authorizing Provider   albuterol (PROVENTIL) 2.5 mg /3 mL (0.083 %) nebulizer solution INHALE 1 VIAL VIA NEBULIZER EVERY 6 HOURS FOR WHEEZING 12/16/19  Yes Darius Mendoza MD   calcium carbonate-vitamin D2 500 mg(1,250mg) -200 unit tablet Take 1 tablet by mouth daily.   Yes Darius Mendoza MD   cholecalciferol, vitamin D3, (VITAMIN D3) 5,000 unit Tab Take by mouth.   Yes PROVIDER, HISTORICAL   citalopram (CELEXA) 20 MG tablet Take 1 tablet (20 mg total) by mouth daily. 12/8/19  Yes Darius Mendoza MD   cyclobenzaprine (FLEXERIL) 5 MG tablet Take 1 tablet (5 mg total) by mouth 2 (two) times a day as needed for muscle spasms. 2/17/19  Yes Darius Mendoza MD   docusate sodium (COLACE) 100 MG capsule Take 100 mg by mouth 2 (two) times a day.   Yes PROVIDER, HISTORICAL   INHALER, ASSIST DEVICES (AEROCHAMBER PLUS Z STAT MISC) Use As Directed.   Yes Darius Mendoza MD   ipratropium (ATROVENT) 0.02 % nebulizer solution INHALE ONE VIAL VIA NEBULIZER EVERY SIX TO EIGHT HOURS AS DIRECTED 1/20/20  Yes Darius Mendoza MD   ipratropium-albuteroL (COMBIVENT RESPIMAT)  mcg/actuation Mist inhaler INHALE ONE PUFF BY MOUTH FOUR TIMES DAILY 2/18/20  Yes Darius Mendoza MD   Lactobacillus rhamnosus GG (CULTURELLE) 15 billion cell CpSP Take 1 capsule  by mouth 2 (two) times a day with meals.   Yes PROVIDER, HISTORICAL   multivitamin with minerals (THERA-M) 9 mg iron-400 mcg Tab tablet Take 1 tablet by mouth at bedtime.   Yes PROVIDER, HISTORICAL   predniSONE (DELTASONE) 20 MG tablet TAKE 2 TABLETS (40 MG TOTAL) BY MOUTH DAILY WITH BREAKFAST FOR 4 DAYS.  Patient taking differently: No sig reported 10/29/18  Yes Darius Mendoza MD   psyllium (METAMUCIL) 3.4 gram packet Take 1 packet by mouth daily.   Yes PROVIDER, HISTORICAL   simvastatin (ZOCOR) 20 MG tablet TAKE 1 TABLET BY MOUTH EVERY DAY 5/19/20  Yes Darius Mendoza MD   SYMBICORT 160-4.5 mcg/actuation inhaler INHALE 2 PUFFS BY MOUTH TWICE A DAY 3/23/20  Yes Darius Mendoza MD       Allergies: No Known Allergies    Immunizations:   Immunization History   Administered Date(s) Administered     DT (pediatric) 01/06/2005     Influenza high dose,seasonal,PF, 65+ yrs 10/08/2015, 10/24/2016, 10/03/2017, 10/09/2018, 09/23/2019, 09/29/2020     Influenza, inj, historic,unspecified 11/20/2007, 10/23/2008, 10/19/2010, 10/20/2011     Influenza, seasonal,quad inj 6-35 mos 10/09/2012, 11/15/2013, 10/29/2014     Pneumo Conj 13-V (2010&after) 07/28/2015     Pneumo Polysac 23-V 01/01/2002, 06/22/2010     Td,adult,historic,unspecified 01/06/2005     Tdap 01/27/2015       Exam Chest clear to auscultation and percussion.  Heart tones regular rhythm without murmur rub or gallop.  Abdomen soft nontender no organomegaly.  No peritoneal signs.  Extremities free of edema cyanosis or clubbing.  Neck veins nondistended no thyromegaly or scleral icterus noted, carotids full.  Skin warm and dry easily conversant good spirited.  Normal intelligence.  Neurologically intact no gross localizing findings.  Negative Norman's test bilaterally both hips.    134/70 pulse 64 respirations 18 O2 sats 98% temperature this afternoon 97.4 degrees.  Weight stable 136 pounds she is 64 inches tall easily conversant highly intelligent short  cropped hair grain.  Pleasant    Assessment and Plan  Osteoarthritis x-ray right hip and LS-spine pending.  Suggest orthopedic consultation with Dr. MAGAN Layne at [facility].    COPD continue Proventil inhaler.    History of hyperlipidemia on statin therapy no target organ damage related to same.  Anxiety better with citalopram.    Time: total time spent with the patient was 25 minutes of which >50% was spent in counseling and coordination of care        Darius Mendoza MD    Patient Active Problem List   Diagnosis     Osteopenia     Chronic Major Depression     Hyperlipidemia     Chronic Bronchitis With Acute Exacerbation     Shingles     COPD (chronic obstructive pulmonary disease) (H)     Hypoxia     Depression     Tobacco abuse

## 2021-06-13 NOTE — PROGRESS NOTES
Office Visit - Follow up    Alpa Ty   76 y.o. female    Date of Visit: 10/3/2017    Chief Complaint   Patient presents with     Hospital Visit Follow Up     COPD   SOB       Subjective: COPD with hospital follow-up to day hospital stay for COPD with flare and exacerbation.  Still short of breath but less so.  Still smoking about a quarter pack per day and has smoked for most of her life.  She was in the hospital and cared for by the hospitalist team.  Flu shot is due today.  Breathing is better worse in the morning.    No blood in stool or urine medication list reviewed generally well-tolerated allergies none known.  O2 sats today 97%.    ROS: A comprehensive review of systems was performed and was otherwise negative    Medications:  Prior to Admission medications    Medication Sig Start Date End Date Taking? Authorizing Provider   albuterol (PROVENTIL) 2.5 mg /3 mL (0.083 %) nebulizer solution INHALE 1 VIAL VIA NEBULIZER EVERY 6 HOURS FOR WHEEZING 9/18/17  Yes Darius Mendoza MD   budesonide-formoterol (SYMBICORT) 160-4.5 mcg/actuation inhaler Inhale 2 puffs 2 (two) times a day.  12/30/16  Yes PROVIDER, HISTORICAL   calcium carbonate-vitamin D2 500 mg(1,250mg) -200 unit tablet Take 1 tablet by mouth daily.   Yes Darius Mendoza MD   cholecalciferol, vitamin D3, (VITAMIN D3) 5,000 unit Tab Take by mouth.   Yes PROVIDER, HISTORICAL   citalopram (CELEXA) 20 MG tablet Take 20 mg by mouth at bedtime.   Yes PROVIDER, HISTORICAL   fluoride, sodium, (DENTAGEL) 1.1 % Gel dental gel Apply 1 application to teeth at bedtime.   Yes PROVIDER, HISTORICAL   ipratropium (ATROVENT) 0.02 % nebulizer solution Take 500 mcg by nebulization every 6 (six) hours as needed for wheezing. Every 6-8 hours prn   Yes PROVIDER, HISTORICAL   ipratropium-albuterol (COMBIVENT RESPIMAT)  mcg/actuation Mist inhaler INHALE ONE PUFF BY MOUTH FOUR TIMES DAILY  Patient taking differently: Take 1 puff by mouth 4 (four) times a day as  needed.  5/15/17  Yes Darius Mendoza MD   multivitamin with minerals (THERA-M) 9 mg iron-400 mcg Tab tablet Take 1 tablet by mouth at bedtime.   Yes PROVIDER, HISTORICAL   simvastatin (ZOCOR) 20 MG tablet Take 20 mg by mouth at bedtime.   Yes PROVIDER, HISTORICAL   cholecalciferol, vitamin D3, (VITAMIN D3) 1,000 unit capsule Take 1,000 Units by mouth daily.  10/3/17 Yes PROVIDER, HISTORICAL   INHALER, ASSIST DEVICES (AEROCHAMBER PLUS Z STAT MISC) Use As Directed.    Darius Mendoza MD   albuterol (PROVENTIL) 2.5 mg /3 mL (0.083 %) nebulizer solution Take 2.5 mg by nebulization every 6 (six) hours as needed for wheezing.  10/3/17  PROVIDER, HISTORICAL       Allergies: No Known Allergies    Immunizations:   Immunization History   Administered Date(s) Administered     DT (pediatric) 01/06/2005     Influenza high dose, seasonal 10/08/2015, 10/24/2016     Influenza, inj, historic 11/20/2007, 10/23/2008, 10/19/2010, 10/20/2011     Influenza, seasonal,quad inj 6-35 mos 10/09/2012, 11/15/2013, 10/29/2014     Pneumo Conj 13-V (2010&after) 07/28/2015     Pneumo Polysac 23-V 01/01/2002, 06/22/2010     Td, historic 01/06/2005     Tdap 01/27/2015       Exam Chest clear to auscultation and percussion.  Heart tones regular rhythm without murmur rub or gallop.  Abdomen soft nontender no organomegaly.  No peritoneal signs.  Extremities free of edema cyanosis or clubbing.  Neck veins nondistended no thyromegaly or scleral icterus noted, carotids full.  Skin warm and dry easily conversant good spirited.  Normal intelligence.  Neurologically intact no gross localizing findings.  There are some diminished breath sounds and scattered sibilant rales noted these are very faint.  There are no accessory muscles of respiration use there is no central or acrocyanosis no tachypnea she is not in acute distress and-and she does not appear acute or chronically ill.  Neuromuscular tone is good BMI is 21.47 and as noted above the O2 sats  today are 97% on room air.    Assessment and Plan  COPD with recent hospital stay today for flare and exacerbation of same improved.    Hyperlipidemia on statin therapy no history of MI or stroke.    Smoking habit advised patient to stop smoking.    Suggest when she is using the Atrovent nebulizer not to use the Combivent at the same or concurrent time because of ipratropium overdose.  RTC 1-2 months time flu shot today advised patient to stop smoking.    Time: total time spent with the patient was 25 minutes of which >50% was spent in counseling and coordination of care        Darius Mendoza MD    Patient Active Problem List   Diagnosis     Osteopenia     Chronic Major Depression     Hyperlipidemia     Chronic Bronchitis With Acute Exacerbation     Shingles     COPD (chronic obstructive pulmonary disease)     Hypoxia     Depression     Tobacco abuse

## 2021-06-14 NOTE — TELEPHONE ENCOUNTER
Refill Approved    Rx renewed per Medication Renewal Policy. Medication was last renewed on 1/11/2021.    Paty Dia, [facility] Connection Triage/Med Refill 2/3/2021     Requested Prescriptions   Pending Prescriptions Disp Refills     rOPINIRole (REQUIP) 0.5 MG tablet [Pharmacy Med Name: ROPINIROLE HCL 0.5 MG TABLET] 30 tablet 2     Sig: TAKE 1 TABLET BY MOUTH AT BEDTIME.       Parkinson's Meds I Refill Protocol Passed - 2/3/2021  9:31 AM        Passed - PCP or prescribing provider visit in past 6 months      Last office visit with prescriber/PCP: 1/11/2021 OR same dept: 1/11/2021 Darius Mendoza MD OR same specialty: 1/11/2021 Darius Mendoza MD Last physical: 8/18/2020 Last MTM visit: Visit date not found     Next appt within 3 mo: Visit date not found  Next physical within 3 mo: Visit date not found  Prescriber OR PCP: Darius Mendoza MD  Last diagnosis associated with med order: 1. Restless legs syndrome (RLS)  - rOPINIRole (REQUIP) 0.5 MG tablet [Pharmacy Med Name: ROPINIROLE HCL 0.5 MG TABLET]; TAKE 1 TABLET BY MOUTH AT BEDTIME.  Dispense: 30 tablet; Refill: 2    If protocol passes may refill for 6 months if within 3 months of last provider visit (or a total of 9 months).             Pramipexole/Ropinirole Refill Protocol Passed - 2/3/2021  9:31 AM        Passed - PCP or prescribing provider visit in past 6 months      Last office visit with prescriber/PCP: 1/11/2021 OR same dept: 1/11/2021 Darius Mendoza MD OR same specialty: 1/11/2021 Darius Mendoza MD Last physical: 8/18/2020 Last MTM visit: Visit date not found         Next appt within 3 mo: Visit date not found  Next physical within 3 mo: Visit date not found  Prescriber OR PCP: Darius Mendoza MD  Last diagnosis associated with med order: 1. Restless legs syndrome (RLS)  - rOPINIRole (REQUIP) 0.5 MG tablet [Pharmacy Med Name: ROPINIROLE HCL 0.5 MG TABLET]; TAKE 1 TABLET BY MOUTH AT BEDTIME.  Dispense: 30 tablet; Refill:  2     If protocol passes may refill for 6 months if within 3 months of last provider visit (or a total of 9 months).

## 2021-06-14 NOTE — PROGRESS NOTES
Assessment & Plan     Restless leg syndrome.  Requip 0.5 mg at bedtime with arthritis strength acetaminophen.    Right greater trochanteric bursitis.  Seen by orthopedist Dr. Ciro Valverde.  Dippel Medrol injection has been done work for 2 days.    Hyperlipidemia on statin therapy no history of MI or stroke stable no target organ damage related to same.    COPD depressed lung sounds throughout no rales rhonchi wheezes asymptomatic.    May be on iron supplement already.  Restless leg syndrome associated with iron deficiency discussed with patient.              25 minutes spent on the date of the encounter doing chart review, patient visit and documentation            No follow-ups on file.    Darius Mendoza MD  [facility]     Alpa Ty is 80 y.o. and presents to clinic today for the following health issues   HPI       COPD Follow-Up    Overall, how are your COPD symptoms since your last clinic visit?  No change    How much fatigue or shortness of breath do you have when you are walking?  None    How much shortness of breath do you have when you are resting?  None    How often do you cough? Never    Have you noticed any change in your sputum/phlegm?Have you experienced a recent fever? No    Please describe how far you can walk without stopping to rest:  Greater than 2 miles    How many flights of stairs are you able to walk up without stopping?  None    Have you had any Emergency Room Visits, Urgent Care Visits, or Hospital Admissions because of your COPD since your last office visit?  No    Social History     Tobacco Use   Smoking Status Former Smoker     Types: Cigarettes   Smokeless Tobacco Never Used   Tobacco Comment    stopped  june 2018           Review of Systems  No blood in stool urine or sputum medication list reviewed reconciled in the chart non-smoker no excess alcohol.  Exercise by walking quite a bit in the [facility] area up to the [facility]  "capital from our old [employer] building [facility] area.      Objective    /76   Pulse 75   Temp 96.8  F (36  C)   Ht 5' 3.75\" (1.619 m)   Wt 135 lb (61.2 kg)   LMP  (LMP Unknown)   SpO2 99%   BMI 23.35 kg/m    Body mass index is 23.35 kg/m .  Physical Exam  Chest clear to auscultation and percussion.  Heart tones regular rhythm without murmur rub or gallop.  Abdomen soft nontender no organomegaly.  No peritoneal signs.  Extremities free of edema cyanosis or clubbing.  Neck veins nondistended no thyromegaly or scleral icterus noted, carotids full.  Skin warm and dry easily conversant good spirited.  Normal intelligence.  Neurologically intact no gross localizing findings.              "

## 2021-06-14 NOTE — PROGRESS NOTES
Office Visit - Follow up    Alpa Ty   76 y.o. female    Date of Visit: 12/5/2017    Chief Complaint   Patient presents with     COPD     Hyperlipidemia       Subjective: COPD.  With hyperlipidemia.  O2 sats at home 95-96% on room air.  She continues to smoke despite our strongest recommendation to stop about a half pack per day the patient smoked for many years despite having COPD.  The patient denies any blood in stool or urine patient hasblood in sputum medication list reviewed well-tolerated.  Intermittently she uses prednisone for COPD flare.    No known drug allergies.  History of chronic major depressive reaction PHQ 9 score measured today measured 2.  No history of target organ damage despite hyperlipidemia no history of MI or stroke no history of xanthoma or xanthelasma.    ROS: A comprehensive review of systems was performed and was otherwise negative    Medications:  Prior to Admission medications    Medication Sig Start Date End Date Taking? Authorizing Provider   albuterol (PROVENTIL) 2.5 mg /3 mL (0.083 %) nebulizer solution INHALE 1 VIAL VIA NEBULIZER EVERY 6 HOURS FOR WHEEZING 9/18/17  Yes Darius Mendoza MD   budesonide-formoterol (SYMBICORT) 160-4.5 mcg/actuation inhaler Inhale 2 puffs 2 (two) times a day.  12/30/16  Yes PROVIDER, HISTORICAL   calcium carbonate-vitamin D2 500 mg(1,250mg) -200 unit tablet Take 1 tablet by mouth daily.   Yes Darius Mendoza MD   cholecalciferol, vitamin D3, (VITAMIN D3) 5,000 unit Tab Take by mouth.   Yes PROVIDER, HISTORICAL   citalopram (CELEXA) 20 MG tablet Take 20 mg by mouth at bedtime.   Yes PROVIDER, HISTORICAL   fluoride, sodium, (DENTAGEL) 1.1 % Gel dental gel Apply 1 application to teeth at bedtime.   Yes PROVIDER, HISTORICAL   INHALER, ASSIST DEVICES (AEROCHAMBER PLUS Z STAT MISC) Use As Directed.   Yes Darius Mendoza MD   ipratropium (ATROVENT) 0.02 % nebulizer solution Take 500 mcg by nebulization every 6 (six) hours as needed for  wheezing. Every 6-8 hours prn   Yes PROVIDER, HISTORICAL   ipratropium-albuterol (COMBIVENT RESPIMAT)  mcg/actuation Mist inhaler INHALE ONE PUFF BY MOUTH FOUR TIMES DAILY  Patient taking differently: Take 1 puff by mouth 4 (four) times a day as needed.  5/15/17  Yes Darius Mendoza MD   multivitamin with minerals (THERA-M) 9 mg iron-400 mcg Tab tablet Take 1 tablet by mouth at bedtime.   Yes PROVIDER, HISTORICAL   simvastatin (ZOCOR) 20 MG tablet Take 20 mg by mouth at bedtime.   Yes PROVIDER, HISTORICAL   predniSONE (DELTASONE) 20 MG tablet TAKE 2 TABLETS (40 MG TOTAL) BY MOUTH DAILY WITH BREAKFAST FOR 4 DAYS. 11/27/17 12/5/17  Darius Mendoza MD       Allergies: No Known Allergies    Immunizations:   Immunization History   Administered Date(s) Administered     DT (pediatric) 01/06/2005     Influenza high dose, seasonal 10/08/2015, 10/24/2016, 10/03/2017     Influenza, inj, historic,unspecified 11/20/2007, 10/23/2008, 10/19/2010, 10/20/2011     Influenza, seasonal,quad inj 6-35 mos 10/09/2012, 11/15/2013, 10/29/2014     Pneumo Conj 13-V (2010&after) 07/28/2015     Pneumo Polysac 23-V 01/01/2002, 06/22/2010     Td,adult,historic,unspecified 01/06/2005     Tdap 01/27/2015       Exam Chest clear to auscultation and percussion.  Heart tones regular rhythm without murmur rub or gallop.  Abdomen soft nontender no organomegaly.  No peritoneal signs.  Extremities free of edema cyanosis or clubbing.  Neck veins nondistended no thyromegaly or scleral icterus noted, carotids full.  Skin warm and dry easily conversant good spirited.  Normal intelligence.  Neurologically intact no gross localizing findings.  Scattered sibilant rhonchi on inspiration and expiration.  Posteriorly.  Not in acute distress no central acrocyanosis no clubbing.  Fully ambulatory nontoxic-appearing BMI is 23.    Assessment and Plan  COPD with smoking history and hyperlipidemia.  As needed prednisone.  Up-to-date with flu vaccine this  season questions regarding Pneumovax as needed prednisone for COPD flare permissible with as needed antibiotic as well.  RTC 2 months time for fasting office visit with labs to include lipid panel.    Time: total time spent with the patient was 25 minutes of which >50% was spent in counseling and coordination of care        Darius Mendoza MD    Patient Active Problem List   Diagnosis     Osteopenia     Chronic Major Depression     Hyperlipidemia     Chronic Bronchitis With Acute Exacerbation     Shingles     COPD (chronic obstructive pulmonary disease)     Hypoxia     Depression     Tobacco abuse

## 2021-06-14 NOTE — TELEPHONE ENCOUNTER
Refill Approved    Rx renewed per Medication Renewal Policy. Medication was last renewed on 5/19/20.    Emigdio Yun, [facility] Connection Triage/Med Refill 2/1/2021     Requested Prescriptions   Pending Prescriptions Disp Refills     simvastatin (ZOCOR) 20 MG tablet [Pharmacy Med Name: SIMVASTATIN 20 MG TABLET] 90 tablet 2     Sig: TAKE 1 TABLET BY MOUTH EVERY DAY       Statins Refill Protocol (Hmg CoA Reductase Inhibitors) Passed - 1/31/2021  9:05 AM        Passed - PCP or prescribing provider visit in past 12 months      Last office visit with prescriber/PCP: 1/11/2021 Darius Mendoza MD OR same dept: 11/13/2020 Darius Mendoza MD OR same specialty: 1/11/2021 Darius Mendoza MD  Last physical: 8/18/2020 Last MTM visit: Visit date not found   Next visit within 3 mo: Visit date not found  Next physical within 3 mo: Visit date not found  Prescriber OR PCP: Darius Mendoza MD  Last diagnosis associated with med order: 1. HTN (hypertension)  - simvastatin (ZOCOR) 20 MG tablet [Pharmacy Med Name: SIMVASTATIN 20 MG TABLET]; TAKE 1 TABLET BY MOUTH EVERY DAY  Dispense: 90 tablet; Refill: 2    If protocol passes may refill for 12 months if within 3 months of last provider visit (or a total of 15 months).

## 2021-06-15 ENCOUNTER — COMMUNICATION - HEALTHEAST (OUTPATIENT)
Dept: INTERNAL MEDICINE | Facility: CLINIC | Age: 81
End: 2021-06-15

## 2021-06-15 DIAGNOSIS — J44.9 COPD (CHRONIC OBSTRUCTIVE PULMONARY DISEASE) (H): ICD-10-CM

## 2021-06-15 RX ORDER — IPRATROPIUM BROMIDE AND ALBUTEROL 20; 100 UG/1; UG/1
SPRAY, METERED RESPIRATORY (INHALATION)
Qty: 1 INHALER | Refills: 3 | Status: SHIPPED | OUTPATIENT
Start: 2021-06-15 | End: 2021-11-19

## 2021-06-15 NOTE — PROGRESS NOTES
Assessment & Plan     Hypertension not optimally controlled 150/78 recheck 146/80.  Start lisinopril 10/12.5 with HCTZ 1 daily.    Restless leg syndrome better with Requip 0.5 mg at bedtime.    Unable to walk because of bursitis right greater trochanteric area.  Prior x-rays of the both hip showed minimal arthritis.  Norman's test bilaterally negative.  There is tenderness in the posterior lateral aspect of the right hip consistent with right greater trochanteric bursitis may be a candidate for steroid injection sometime soon with orthopedic consulting [place].    Review of external notes as documented in note  25 minutes spent on the date of the encounter doing chart review, patient visit and documentation        No follow-ups on file.    Darius Mendoza MD  [facility]   Alpa Ty is 80 y.o. and presents today for the following health issues   HPI       Hypertension Follow-up      Do you check your blood pressure regularly outside of the clinic? Yes     Are you following a low salt diet? No    Are your blood pressures ever more than 140 on the top number (systolic) OR more       than 90 on the bottom number (diastolic), for example 140/90? No      How many servings of fruits and vegetables do you eat daily?  0-1    On average, how many sweetened beverages do you drink each day (Examples: soda, juice, sweet tea, etc.  Do NOT count diet or artificially sweetened beverages)?   1    How many days per week do you exercise enough to make your heart beat faster? 3 or less    How many minutes a day do you exercise enough to make your heart beat faster? 9 or less    How many days per week do you miss taking your medication? 0        Review of Systems  No blood in stool or urine no chest pain or shortness of breath medication list reviewed reconciled.  Requip seems to be helping restless leg syndrome.      Objective    /80   Pulse 72   Temp 97.3  F (36.3  C)   Ht 5'  "3.75\" (1.619 m)   Wt 135 lb (61.2 kg)   LMP  (LMP Unknown)   BMI 23.35 kg/m    Body mass index is 23.35 kg/m .  Physical Exam  Chest clear to auscultation and percussion.  Heart tones regular rhythm without murmur rub or gallop.  Abdomen soft nontender no organomegaly.  No peritoneal signs.  Extremities free of edema cyanosis or clubbing.  Neck veins nondistended no thyromegaly or scleral icterus noted, carotids full.  Skin warm and dry easily conversant good spirited.  Normal intelligence.  Neurologically intact no gross localizing findings.  Lateral Norman's test negative.  Tenderness over the right greater trochanteric bursa area blood pressure was elevated other vital signs are stable she is afebrile O2 sats 96% BMI is ideal at 23.    Advised continuation of walking salt restriction and diet exercise maintenance of the optimal weight she is add lisinopril with HCTZ 10/12.51 daily recheck blood pressure 1 to 2 months.              "

## 2021-06-15 NOTE — PROGRESS NOTES
Office Visit - Follow up    Alpa Ty   77 y.o. female    Date of Visit: 2/6/2018    Chief Complaint   Patient presents with     COPD       Subjective: COPD and hyperlipidemia.  The patient continues to smoke despite our strongest recommendation to stop smoking.  She smokes about 2-3 cigarettes per day.  The patient also is followed by Dr. Hurt from [place] [facility].  She saw Dr. Hurt on January 26, 2018 and was found to have chronic bronchitis.  The patient has underlying COPD and notes some ecchymoses on her forearms left side.  In the past when she was hospitalized she received high doses of steroids or prednisone for long periods.  Capital fragility ensued.  No blood in stool or urine no blood in sputum medication list reviewed well-tolerated.    ROS: A comprehensive review of systems was performed and was otherwise negative    Medications:  Prior to Admission medications    Medication Sig Start Date End Date Taking? Authorizing Provider   albuterol (PROVENTIL) 2.5 mg /3 mL (0.083 %) nebulizer solution INHALE 1 VIAL VIA NEBULIZER EVERY 6 HOURS FOR WHEEZING 9/18/17  Yes Darisu Mendoza MD   budesonide-formoterol (SYMBICORT) 160-4.5 mcg/actuation inhaler Inhale 2 puffs 2 (two) times a day.  12/30/16  Yes PROVIDER, HISTORICAL   calcium carbonate-vitamin D2 500 mg(1,250mg) -200 unit tablet Take 1 tablet by mouth daily.   Yes Darius Mendoza MD   cholecalciferol, vitamin D3, (VITAMIN D3) 5,000 unit Tab Take by mouth.   Yes PROVIDER, HISTORICAL   citalopram (CELEXA) 20 MG tablet TAKE ONE TABLET BY MOUTH ONE TIME DAILY 12/17/17  Yes Darius Mendoza MD   fluoride, sodium, (DENTAGEL) 1.1 % Gel dental gel Apply 1 application to teeth at bedtime.   Yes PROVIDER, HISTORICAL   INHALER, ASSIST DEVICES (AEROCHAMBER PLUS Z STAT MISC) Use As Directed.   Yes Darius Mendoza MD   ipratropium (ATROVENT) 0.02 % nebulizer solution Take 500 mcg by nebulization every 6 (six) hours as  needed for wheezing. Every 6-8 hours prn   Yes PROVIDER, HISTORICAL   ipratropium-albuterol (COMBIVENT RESPIMAT)  mcg/actuation Mist inhaler INHALE ONE PUFF BY MOUTH FOUR TIMES DAILY  Patient taking differently: Take 1 puff by mouth 4 (four) times a day as needed.  5/15/17  Yes Darius Mendoza MD   multivitamin with minerals (THERA-M) 9 mg iron-400 mcg Tab tablet Take 1 tablet by mouth at bedtime.   Yes PROVIDER, HISTORICAL   simvastatin (ZOCOR) 20 MG tablet Take 20 mg by mouth at bedtime.   Yes PROVIDER, HISTORICAL   citalopram (CELEXA) 20 MG tablet Take 20 mg by mouth at bedtime.  2/6/18  PROVIDER, HISTORICAL       Allergies: No Known Allergies    Immunizations:   Immunization History   Administered Date(s) Administered     DT (pediatric) 01/06/2005     Influenza high dose, seasonal 10/08/2015, 10/24/2016, 10/03/2017     Influenza, inj, historic,unspecified 11/20/2007, 10/23/2008, 10/19/2010, 10/20/2011     Influenza, seasonal,quad inj 6-35 mos 10/09/2012, 11/15/2013, 10/29/2014     Pneumo Conj 13-V (2010&after) 07/28/2015     Pneumo Polysac 23-V 01/01/2002, 06/22/2010     Td,adult,historic,unspecified 01/06/2005     Tdap 01/27/2015       Exam Chest clear to auscultation and percussion.  Heart tones regular rhythm without murmur rub or gallop.  Abdomen soft nontender no organomegaly.  No peritoneal signs.  Extremities free of edema cyanosis or clubbing.  Neck veins nondistended no thyromegaly or scleral icterus noted, carotids full.  Skin warm and dry easily conversant good spirited.  Normal intelligence.  Neurologically intact no gross localizing findings.  Few dry crackly rales at the left lung base.    Assessment and Plan  COPD\chronic bronchitis with history of depression PHQ 9 score today measured 1.    Smoking habit advised patient stop smoking no known drug allergies.  Hyperlipidemia on statin therapy no history of MI or stroke no history of xanthoma or xanthelasma.    Time: total time spent with  the patient was 25 minutes of which >50% was spent in counseling and coordination of care    The following high BMI interventions were performed this visit: encouragement to exercise    Darius Mendoza MD    Patient Active Problem List   Diagnosis     Osteopenia     Chronic Major Depression     Hyperlipidemia     Chronic Bronchitis With Acute Exacerbation     Shingles     COPD (chronic obstructive pulmonary disease)     Hypoxia     Depression     Tobacco abuse

## 2021-06-16 PROBLEM — R09.02 HYPOXIA: Status: ACTIVE | Noted: 2017-09-11

## 2021-06-16 PROBLEM — J44.9 COPD (CHRONIC OBSTRUCTIVE PULMONARY DISEASE) (H): Status: ACTIVE | Noted: 2017-09-11

## 2021-06-16 NOTE — PROGRESS NOTES
Assessment & Plan     Hypertension with fair control.  142/72.  No target organ damage related to same.    Has received both Covid vaccines most recently the second dose last week.  No ill effects.    Traumatic injury to left lower extremity from her pet cat who is had shots with mild secondary infection involving one lesion on the left lower extremity medial aspect of the anterior tibial surface long term down about 1 inch in elliptical diameter with mild surrounding erythema no lymphangitis no sign of toxicity afebrile.  97.2 suggest wound care with bacitracin ointment topically keep covered.    Review of external notes as documented in note  25 minutes spent on the date of the encounter doing chart review, patient visit and documentation        No follow-ups on file.    Darius Mendoza MD  [facility]   Alpa Ty is 80 y.o. and presents today for the following health issues   HPI       Hypertension Follow-up      Do you check your blood pressure regularly outside of the clinic? Yes     Are you following a low salt diet? No    Are your blood pressures ever more than 140 on the top number (systolic) OR more       than 90 on the bottom number (diastolic), for example 140/90? No      How many servings of fruits and vegetables do you eat daily?  0-1    On average, how many sweetened beverages do you drink each day (Examples: soda, juice, sweet tea, etc.  Do NOT count diet or artificially sweetened beverages)?   0    How many days per week do you exercise enough to make your heart beat faster? 3 or less    How many minutes a day do you exercise enough to make your heart beat faster? 9 or less    How many days per week do you miss taking your medication? 0        Review of Systems  No blood in stool or urine no chest pain or shortness of breath medication list reviewed reconciled.    Previous a smoker not smoking now note does not use alcohol to excess.  No known drug  "allergies.      Objective    /72 (Patient Site: Right Arm, Patient Position: Sitting)   Pulse 68   Temp 97.2  F (36.2  C)   Ht 5' 3.75\" (1.619 m)   Wt 137 lb (62.1 kg)   LMP  (LMP Unknown)   SpO2 96%   BMI 23.70 kg/m    Body mass index is 23.7 kg/m .  Physical Exam  Chest clear to auscultation and percussion.  Heart tones regular rhythm without murmur rub or gallop.  Abdomen soft nontender no organomegaly.  No peritoneal signs.  Extremities free of edema cyanosis or clubbing.  Neck veins nondistended no thyromegaly or scleral icterus noted, carotids full.  Skin warm and dry easily conversant good spirited.  Normal intelligence.  Neurologically intact no gross localizing findings.  Diminished breath sounds throughout posteriorly no rales rhonchi or wheezes.  No neck vein distention no leg edema left lower extremity was examined closely.      There is no evidence for advancing cellulitis or lymphangitis.  Mild irritation or inflammation along a skin flap with an eschar over it about 1 inch in elliptical diameter on the medial aspect of the anterior tibial surface care home down.  Suggest wound care keep covered bacitracin topical ointment twice daily.              "

## 2021-06-16 NOTE — TELEPHONE ENCOUNTER
Refill Approved    Rx renewed per Medication Renewal Policy. Medication was last renewed on 1/20/20.    Emigdio Yun, [facility] Triage/Med Refill 3/22/2021     Requested Prescriptions   Pending Prescriptions Disp Refills     ipratropium (ATROVENT) 0.02 % nebulizer solution [Pharmacy Med Name: IPRATROPIUM BR 0.02% SOLN] 250 mL 2     Sig: INHALE 1 VIAL VIA NEBULIZER EVERY SIX TO EIGHT HOURS AS DIRECTED       Ipratropium/Tiotropium/Umeclidinium Refill Protocol Passed - 3/21/2021  9:02 AM        Passed - PCP or prescribing provider visit in last 6 months     Last office visit with prescriber/PCP: 2/12/2021 OR same dept: 11/13/2020 Darius Mendoza MD OR same specialty: 2/12/2021 Darius Mendoza MD Last physical: Visit date not found Last MTM visit: Visit date not found     Next appt within 3 mo: Visit date not found  Next physical within 3 mo: Visit date not found  Prescriber OR PCP: Darius Mendoza MD  Last diagnosis associated with med order: 1. Chronic obstructive pulmonary disease, unspecified COPD type (H)  - ipratropium (ATROVENT) 0.02 % nebulizer solution [Pharmacy Med Name: IPRATROPIUM BR 0.02% SOLN]; INHALE 1 VIAL VIA NEBULIZER EVERY SIX TO EIGHT HOURS AS DIRECTED  Dispense: 250 mL; Refill: 2    If protocol passes may refill for 6 months if within 3 months of last provider visit (or a total of 9 months).

## 2021-06-17 NOTE — PROGRESS NOTES
Office Visit - Follow up    Alpa Ty   77 y.o. female    Date of Visit: 4/17/2018    Chief Complaint   Patient presents with     COPD     Hyperlipidemia       Subjective: COPD.    Pain left side lower rib margin may have pulled a muscle.  Using acetaminophen.  Lipids last checked February 6, 2018 especially when she takes a deep breath she has pain pleuritic-like.  Past health history of heavy smoking cutting down now.    Hyperlipidemia as well no history of MI or stroke prior history of depression PHQ 9 score noted previously.    No blood in stool or urine or sputum medication list reviewed.  Thought the pain in her left upper quadrant beneath the rib margin was related to gallbladder disease.  I explained differently.    ROS: A comprehensive review of systems was performed and was otherwise negative    Medications:  Prior to Admission medications    Medication Sig Start Date End Date Taking? Authorizing Provider   albuterol (PROVENTIL) 2.5 mg /3 mL (0.083 %) nebulizer solution INHALE 1 VIAL VIA NEBULIZER EVERY 6 HOURS FOR WHEEZING 9/18/17  Yes Darius Mendoza MD   budesonide-formoterol (SYMBICORT) 160-4.5 mcg/actuation inhaler Inhale 2 puffs 2 (two) times a day.  12/30/16  Yes PROVIDER, HISTORICAL   calcium carbonate-vitamin D2 500 mg(1,250mg) -200 unit tablet Take 1 tablet by mouth daily.   Yes Darius Mendoza MD   cholecalciferol, vitamin D3, (VITAMIN D3) 5,000 unit Tab Take by mouth.   Yes PROVIDER, HISTORICAL   citalopram (CELEXA) 20 MG tablet TAKE ONE TABLET BY MOUTH ONE TIME DAILY 12/17/17  Yes Darius Mendoza MD   fluoride, sodium, (DENTAGEL) 1.1 % Gel dental gel Apply 1 application to teeth at bedtime.   Yes PROVIDER, HISTORICAL   ipratropium (ATROVENT) 0.02 % nebulizer solution Take 500 mcg by nebulization every 6 (six) hours as needed for wheezing. Every 6-8 hours prn   Yes PROVIDER, HISTORICAL   ipratropium-albuterol (COMBIVENT RESPIMAT)  mcg/actuation Mist inhaler INHALE ONE  PUFF BY MOUTH FOUR TIMES DAILY  Patient taking differently: Take 1 puff by mouth 4 (four) times a day as needed.  5/15/17  Yes Darius Mendoza MD   multivitamin with minerals (THERA-M) 9 mg iron-400 mcg Tab tablet Take 1 tablet by mouth at bedtime.   Yes PROVIDER, HISTORICAL   simvastatin (ZOCOR) 20 MG tablet Take 20 mg by mouth at bedtime.   Yes PROVIDER, HISTORICAL   INHALER, ASSIST DEVICES (AEROCHAMBER PLUS Z STAT MISC) Use As Directed.    Darius Mendoza MD       Allergies: No Known Allergies    Immunizations:   Immunization History   Administered Date(s) Administered     DT (pediatric) 01/06/2005     Influenza high dose, seasonal 10/08/2015, 10/24/2016, 10/03/2017     Influenza, inj, historic,unspecified 11/20/2007, 10/23/2008, 10/19/2010, 10/20/2011     Influenza, seasonal,quad inj 6-35 mos 10/09/2012, 11/15/2013, 10/29/2014     Pneumo Conj 13-V (2010&after) 07/28/2015     Pneumo Polysac 23-V 01/01/2002, 06/22/2010     Td,adult,historic,unspecified 01/06/2005     Tdap 01/27/2015       Exam Chest clear to auscultation and percussion.  Heart tones regular rhythm without murmur rub or gallop.  Abdomen soft nontender no organomegaly.  No peritoneal signs.  Extremities free of edema cyanosis or clubbing.  Neck veins nondistended no thyromegaly or scleral icterus noted, carotids full.  Skin warm and dry easily conversant good spirited.  Normal intelligence.  Neurologically intact no gross localizing findings.  Slightly tender left lower rib margin along the midclavicular line left side.  No associated rash slightly tender to the touch offered chest x-ray patient declined.  Happy that it is not her gallbladder.    Assessment and Plan  COPD with left lower rib margin pain midclavicular line left side.  Not gallbladder disease.  Wrong side.    Smoking habit advised patient stop smoking.    Hyperlipidemia no history of MI or stroke.    Smoking habit advised patient to stop smoking.    Time: total time spent with  the patient was 25 minutes of which >50% was spent in counseling and coordination of care    The following high BMI interventions were performed this visit: encouragement to exercise    Darius Mendoza MD    Patient Active Problem List   Diagnosis     Osteopenia     Chronic Major Depression     Hyperlipidemia     Chronic Bronchitis With Acute Exacerbation     Shingles     COPD (chronic obstructive pulmonary disease)     Hypoxia     Depression     Tobacco abuse

## 2021-06-18 NOTE — LETTER
Letter by Darius Mendoza MD at      Author: Darius Mendoza MD Service: -- Author Type: --    Filed:  Encounter Date: 2/12/2019 Status: (Other)       Alpa Ty  [address]  [address]  [place] [address]             February 12, 2019         Dear Ms. Ty,    Below are the results from your recent visit:    Resulted Orders   Lipid Cascade   Result Value Ref Range    Cholesterol 197 <=199 mg/dL    Triglycerides 47 <=149 mg/dL    HDL Cholesterol 106 >=50 mg/dL    LDL Calculated 82 <=129 mg/dL    Patient Fasting > 8hrs? Yes        All very good results    Please call with questions or contact us using [employer].    Sincerely,        Electronically signed by Darius Mendoza MD

## 2021-06-19 NOTE — PROGRESS NOTES
Office Visit - Follow up    Alpa Ty   77 y.o. female    Date of Visit: 8/17/2018    Chief Complaint   Patient presents with     COPD     Hyperlipidemia       Subjective: COPD with hyperlipidemia.  Trying to quit smoking and has been successful.  With Chantix.  In the past used citalopram for problems with mood but never depressed denies any suicidal ideations.  No blood in stool or urine medication list reviewed well-tolerated normal effects.  Less cough breathing easier O2 sats at home 95 or 96% on room air.    ROS: A comprehensive review of systems was performed and was otherwise negative    Medications:  Prior to Admission medications    Medication Sig Start Date End Date Taking? Authorizing Provider   albuterol (PROVENTIL) 2.5 mg /3 mL (0.083 %) nebulizer solution INHALE 1 VIAL VIA NEBULIZER EVERY 6 HOURS FOR WHEEZING 9/18/17  Yes Darius Mendoza MD   budesonide-formoterol (SYMBICORT) 160-4.5 mcg/actuation inhaler Inhale 2 puffs 2 (two) times a day.  12/30/16  Yes PROVIDER, HISTORICAL   calcium carbonate-vitamin D2 500 mg(1,250mg) -200 unit tablet Take 1 tablet by mouth daily.   Yes Darius Mendoza MD   cholecalciferol, vitamin D3, (VITAMIN D3) 5,000 unit Tab Take by mouth.   Yes PROVIDER, HISTORICAL   citalopram (CELEXA) 20 MG tablet TAKE ONE TABLET BY MOUTH ONE TIME DAILY 12/17/17  Yes Darius Mendoza MD   ipratropium (ATROVENT) 0.02 % nebulizer solution Take 500 mcg by nebulization every 6 (six) hours as needed for wheezing. Every 6-8 hours prn   Yes PROVIDER, HISTORICAL   ipratropium-albuterol (COMBIVENT RESPIMAT)  mcg/actuation Mist inhaler INHALE ONE PUFF BY MOUTH FOUR TIMES DAILY  Patient taking differently: Take 1 puff by mouth 4 (four) times a day as needed.  5/15/17  Yes Darius Mendoza MD   multivitamin with minerals (THERA-M) 9 mg iron-400 mcg Tab tablet Take 1 tablet by mouth at bedtime.   Yes PROVIDER, HISTORICAL   nicotine polacrilex (COMMIT) 2 MG lozenge Apply 2 mg to  cheek. 6/12/18  Yes PROVIDER, HISTORICAL   simvastatin (ZOCOR) 20 MG tablet TAKE ONE TABLET BY MOUTH ONE TIME DAILY 5/28/18  Yes Darius Mendoza MD   INHALER, ASSIST DEVICES (AEROCHAMBER PLUS Z STAT MISC) Use As Directed.    Darius Mendoza MD   fluoride, sodium, (DENTAGEL) 1.1 % Gel dental gel Apply 1 application to teeth at bedtime.  8/17/18  PROVIDER, HISTORICAL   simvastatin (ZOCOR) 20 MG tablet Take 20 mg by mouth at bedtime.  8/17/18  PROVIDER, HISTORICAL       Allergies: No Known Allergies    Immunizations:   Immunization History   Administered Date(s) Administered     DT (pediatric) 01/06/2005     Influenza high dose, seasonal 10/08/2015, 10/24/2016, 10/03/2017     Influenza, inj, historic,unspecified 11/20/2007, 10/23/2008, 10/19/2010, 10/20/2011     Influenza, seasonal,quad inj 6-35 mos 10/09/2012, 11/15/2013, 10/29/2014     Pneumo Conj 13-V (2010&after) 07/28/2015     Pneumo Polysac 23-V 01/01/2002, 06/22/2010     Td,adult,historic,unspecified 01/06/2005     Tdap 01/27/2015       Exam Chest clear to auscultation and percussion.  Heart tones regular rhythm without murmur rub or gallop.  Abdomen soft nontender no organomegaly.  No peritoneal signs.  Extremities free of edema cyanosis or clubbing.  Neck veins nondistended no thyromegaly or scleral icterus noted, carotids full.  Skin warm and dry easily conversant good spirited.  Normal intelligence.  Neurologically intact no gross localizing findings.  Diminished breath sounds throughout few squeaky rales but soft.  No leg edema.  Lipoma right forearm and thrombosed small vein anterior tibial surface distally right side.  Reassured on both.  Some ecchymosis from capillary fragility noted.  Prior prednisone use.    Assessment and Plan  COPD off smoking.    Hyperlipidemia needs lipid panel rechecked with fasting office visit in 2 months time.    Smoking habit off smoking with the help of Chantix.  RTC 2 months time for fasting office visit with labs to  include lipid panel.    Lipoma right forearm reassured.    Thrombosed vein right distal extremity lower small.    Capillary fragility secondary to prior steroid use.    Time: total time spent with the patient was 25 minutes of which >50% was spent in counseling and coordination of care    The following high BMI interventions were performed this visit: encouragement to exercise    Darius Mendoza MD    Patient Active Problem List   Diagnosis     Osteopenia     Chronic Major Depression     Hyperlipidemia     Chronic Bronchitis With Acute Exacerbation     Shingles     COPD (chronic obstructive pulmonary disease) (H)     Hypoxia     Depression     Tobacco abuse

## 2021-06-19 NOTE — PROGRESS NOTES
Admission History & Physical  Alpa Ty, 1940, [id number]    [facility]  Darius Mendoza MD, [phone]    Extended Emergency Contact Information  Primary Emergency Contact: Karel Ty   [facility]  Home Phone: [phone]  Relation: Child  Secondary Emergency Contact: Osvaldo Ty   [facility]  Home Phone: [phone]  Mobile Phone: [phone]  Relation: Spouse     Assessment and Plan:   Assessment: Tyloma second toe right foot  Plan: Debrided hyperkeratotic lesion second toe right foot  Active Problems:    * No active hospital problems. *      Chief Complaint:  Second toe right foot     HPI:    Alpa Ty is a 77 y.o. old female presented to the clinic today complaining of pain on the inside portion of her second toe right foot.  She has had this for several months.  She stated that the pain can be aggravated by certain shoes.  She does have some shoes that do not aggravate the toe and she has very little discomfort.  She has not had any redness, swelling, drainage or bleeding.  She has not had any trauma to the toe.  She denies any other previous treatment.  History is provided by patient    Medical History  Active Ambulatory (Non-Hospital) Problems    Diagnosis     Tobacco abuse     COPD (chronic obstructive pulmonary disease) (H)     Hypoxia     Depression     Shingles     Osteopenia     Chronic Major Depression     Hyperlipidemia     Chronic Bronchitis With Acute Exacerbation     Past Medical History:   Diagnosis Date     Chronic bronchitis with acute exacerbation (H)      COPD (chronic obstructive pulmonary disease) (H)      Hyperlipidemia      Patient Active Problem List    Diagnosis Date Noted     Tobacco abuse      COPD (chronic obstructive pulmonary disease) (H) 09/11/2017     Hypoxia 09/11/2017     Depression      Shingles 04/15/2016     Osteopenia      Chronic Major Depression      Hyperlipidemia      Chronic Bronchitis With Acute  Exacerbation      Surgical History  She  has no past surgical history on file.   History reviewed. No pertinent surgical history. Social History  Reviewed, and she  reports that she has quit smoking. Her smoking use included Cigarettes. She has never used smokeless tobacco. She reports that she does not drink alcohol or use illicit drugs.  Social History   Substance Use Topics     Smoking status: Former Smoker     Types: Cigarettes     Smokeless tobacco: Never Used      Comment: 2-3 daily     Alcohol use No      Allergies  No Known Allergies Family History  Reviewed, and family history is not on file.   Psychosocial Needs  Social History     Social History Narrative     Additional psychosocial needs reviewed per nursing assessment.       Prior to Admission Medications     (Not in a hospital admission)        Review of Systems - Negative     Pulse 81  Resp 16  LMP  (LMP Unknown)  SpO2 98%    Objective findings: General: The patient is alert and in no acute distress      Integument: Nails bilateral feet are normal length and color.  Skin bilateral feet warm and intact.  There is a thick hyperkeratotic nucleated lesion on the distal medial aspect of the second toe right foot.      Neurologic: Negative clonus, negative Babinski bilateral feet.        Vascular: DP and PT pulses +2/4 bilateral feet.  Capillary refill less than 2 seconds bilaterally.      Musculoskeletal: Range of motion within normal limits bilateral feet.  Muscle power +5/5 bilaterally in all compartments.      Assessment: Tyloma second toe right foot       Plan: I debrided the hyperkeratotic lesion on the second toe right foot today.  I informed the patient this could recur.  She is to return to the clinic if the lesion does recur.   [id number]

## 2021-06-19 NOTE — LETTER
Letter by Darius Mendoza MD at      Author: Darius Mendoza MD Service: -- Author Type: --    Filed:  Encounter Date: 10/17/2019 Status: Signed         Alpa Ty  [address]  [place] [address]             October 17, 2019         Dear Ms. Ty,    Below are the results from your recent visit:    Resulted Orders   Lipid Cascade   Result Value Ref Range    Cholesterol 205 (H) <=199 mg/dL    Triglycerides 69 <=149 mg/dL    HDL Cholesterol 110 >=50 mg/dL    LDL Calculated 81 <=129 mg/dL    Patient Fasting > 8hrs? Unknown        All very good results    Please call with questions or contact us using [employer].    Sincerely,        Electronically signed by Darius Mendoza MD

## 2021-06-20 NOTE — LETTER
Letter by Darius Mendoza MD at      Author: Darius Mendoza MD Service: -- Author Type: --    Filed:  Encounter Date: 2/18/2020 Status: (Other)         Alpa Ty  [address]  [place] [address]             February 18, 2020         Dear Ms. Ty,    Below are the results from your recent visit:    Resulted Orders   Lipid Cascade   Result Value Ref Range    Cholesterol 206 (H) <=199 mg/dL    Triglycerides 49 <=149 mg/dL    HDL Cholesterol 110 >=50 mg/dL    LDL Calculated 86 <=129 mg/dL    Patient Fasting > 8hrs? Yes        All very good results    Please call with questions or contact us using [employer].    Sincerely,        Electronically signed by Darius Mendoza MD

## 2021-06-20 NOTE — LETTER
Letter by Darius Mendoza MD at      Author: Darius Mendoza MD Service: -- Author Type: --    Filed:  Encounter Date: 8/18/2020 Status: (Other)         Alpa Ty  [address]  [place] [address]             August 18, 2020         Dear Ms. Ty,    Below are the results from your recent visit:    Resulted Orders   HM2(CBC w/o Differential)   Result Value Ref Range    WBC 6.2 4.0 - 11.0 thou/uL    RBC 4.78 3.80 - 5.40 mill/uL    Hemoglobin 14.2 12.0 - 16.0 g/dL    Hematocrit 43.6 35.0 - 47.0 %    MCV 91 80 - 100 fL    MCH 29.7 27.0 - 34.0 pg    MCHC 32.5 32.0 - 36.0 g/dL    RDW 12.8 11.0 - 14.5 %    Platelets 215 140 - 440 thou/uL    MPV 7.9 7.0 - 10.0 fL   Comprehensive Metabolic Panel   Result Value Ref Range    Sodium 144 136 - 145 mmol/L    Potassium 4.2 3.5 - 5.0 mmol/L    Chloride 108 (H) 98 - 107 mmol/L    CO2 28 22 - 31 mmol/L    Anion Gap, Calculation 8 5 - 18 mmol/L    Glucose 78 70 - 125 mg/dL    BUN 15 8 - 28 mg/dL    Creatinine 1.00 0.60 - 1.10 mg/dL    GFR MDRD Af Amer >60 >60 mL/min/1.73m2    GFR MDRD Non Af Amer 53 (L) >60 mL/min/1.73m2    Bilirubin, Total 0.4 0.0 - 1.0 mg/dL    Calcium 9.7 8.5 - 10.5 mg/dL    Protein, Total 6.4 6.0 - 8.0 g/dL    Albumin 4.0 3.5 - 5.0 g/dL    Alkaline Phosphatase 65 45 - 120 U/L    AST 26 0 - 40 U/L    ALT 20 0 - 45 U/L    Narrative    Fasting Glucose reference range is 70-99 mg/dL per  American Diabetes Association (ADA) guidelines.   Lipid Cascade   Result Value Ref Range    Cholesterol 215 (H) <=199 mg/dL    Triglycerides 62 <=149 mg/dL    HDL Cholesterol 113 >=50 mg/dL    LDL Calculated 90 <=129 mg/dL    Patient Fasting > 8hrs? Unknown    Thyroid Stimulating Hormone (TSH)   Result Value Ref Range    TSH 1.95 0.30 - 5.00 uIU/mL       All very good results     Please call with questions or contact us using MyChart.    Sincerely,        Electronically signed by Darius Mendoza MD

## 2021-06-20 NOTE — LETTER
Letter by Darius Mendoza MD at      Author: Darius Mendoza MD Service: -- Author Type: --    Filed:  Encounter Date: 8/18/2020 Status: (Other)         Alpa Ty  [address]  [place] [address]             August 18, 2020         Dear Ms. Ty,    Below are the results from your recent visit:    Resulted Orders   HM2(CBC w/o Differential)   Result Value Ref Range    WBC 6.2 4.0 - 11.0 thou/uL    RBC 4.78 3.80 - 5.40 mill/uL    Hemoglobin 14.2 12.0 - 16.0 g/dL    Hematocrit 43.6 35.0 - 47.0 %    MCV 91 80 - 100 fL    MCH 29.7 27.0 - 34.0 pg    MCHC 32.5 32.0 - 36.0 g/dL    RDW 12.8 11.0 - 14.5 %    Platelets 215 140 - 440 thou/uL    MPV 7.9 7.0 - 10.0 fL   Comprehensive Metabolic Panel   Result Value Ref Range    Sodium 144 136 - 145 mmol/L    Potassium 4.2 3.5 - 5.0 mmol/L    Chloride 108 (H) 98 - 107 mmol/L    CO2 28 22 - 31 mmol/L    Anion Gap, Calculation 8 5 - 18 mmol/L    Glucose 78 70 - 125 mg/dL    BUN 15 8 - 28 mg/dL    Creatinine 1.00 0.60 - 1.10 mg/dL    GFR MDRD Af Amer >60 >60 mL/min/1.73m2    GFR MDRD Non Af Amer 53 (L) >60 mL/min/1.73m2    Bilirubin, Total 0.4 0.0 - 1.0 mg/dL    Calcium 9.7 8.5 - 10.5 mg/dL    Protein, Total 6.4 6.0 - 8.0 g/dL    Albumin 4.0 3.5 - 5.0 g/dL    Alkaline Phosphatase 65 45 - 120 U/L    AST 26 0 - 40 U/L    ALT 20 0 - 45 U/L    Narrative    Fasting Glucose reference range is 70-99 mg/dL per  American Diabetes Association (ADA) guidelines.   Lipid Cascade   Result Value Ref Range    Cholesterol 215 (H) <=199 mg/dL    Triglycerides 62 <=149 mg/dL    HDL Cholesterol 113 >=50 mg/dL    LDL Calculated 90 <=129 mg/dL    Patient Fasting > 8hrs? Unknown        All very good results     Please call with questions or contact us using [facility].    Sincerely,        Electronically signed by Darius Mendoza MD

## 2021-06-21 NOTE — PROGRESS NOTES
Office Visit - Follow up    Alpa Ty   77 y.o. female    Date of Visit: 11/5/2018    Chief Complaint   Patient presents with     COPD     Hyperlipidemia     fasting       Subjective: COPD.    Cat scratch right lower extremity healing slowly suggest bacitracin topically and keep covered not secondarily infected.    Disorders of lipid metabolism.  Discussed in detail no primary prevention issues like stroke or heart attack for this patient tolerates simvastatin 20 mg daily quite well.  No myalgias.    Hypertension controlled very well at 128/66 today no secondary target organ issues or complications is a right result of hypertension or hyperlipidemia.  No history of MI or stroke.    Cognitive function is good the patient denies any problems with memory loss.  She had concerns regarding use of prednisone indiscriminately and discussed at length.    Nonspecific depressive disorder does not seem to be in play at this time her mental attitude is good she is sleeping well and in her relating with people extremely well not depressed no suicidal ideations.  The patient is on citalopram 20 mg daily and tolerates it well.    No blood in stool or urine medication list reviewed well-tolerated.  Suggested repeat mammogram as last done October 16, 2013.  Last lipids checked February 6, 2018 offered lipid testing today but patient declined.    ROS: A comprehensive review of systems was performed and was otherwise negative    Medications:  Prior to Admission medications    Medication Sig Start Date End Date Taking? Authorizing Provider   albuterol (PROVENTIL) 2.5 mg /3 mL (0.083 %) nebulizer solution INHALE 1 VIAL VIA NEBULIZER EVERY 6 HOURS FOR WHEEZING 9/9/18  Yes Darius Mendoza MD   budesonide-formoterol (SYMBICORT) 160-4.5 mcg/actuation inhaler Inhale 2 puffs 2 (two) times a day.  12/30/16  Yes PROVIDER, HISTORICAL   calcium carbonate-vitamin D2 500 mg(1,250mg) -200 unit tablet Take 1 tablet by mouth daily.   Yes  Darius Mendoza MD   cholecalciferol, vitamin D3, (VITAMIN D3) 5,000 unit Tab Take by mouth.   Yes PROVIDER, HISTORICAL   citalopram (CELEXA) 20 MG tablet TAKE ONE TABLET BY MOUTH ONE TIME DAILY 12/17/17  Yes Darius Mendoza MD   INHALER, ASSIST DEVICES (AEROCHAMBER PLUS Z STAT MISC) Use As Directed.   Yes Darius Mendoza MD   ipratropium (ATROVENT) 0.02 % nebulizer solution INHALE ONE VIAL VIA NEBULIZER EVERY SIX TO EIGHT HOURS AS DIRECTED 10/8/18  Yes Darius Mendoza MD   ipratropium-albuterol (COMBIVENT RESPIMAT)  mcg/actuation Mist inhaler INHALE ONE PUFF BY MOUTH FOUR TIMES DAILY  Patient taking differently: Take 1 puff by mouth 4 (four) times a day as needed.  5/15/17  Yes Darius Mendoza MD   multivitamin with minerals (THERA-M) 9 mg iron-400 mcg Tab tablet Take 1 tablet by mouth at bedtime.   Yes PROVIDER, HISTORICAL   predniSONE (DELTASONE) 20 MG tablet TAKE 2 TABLETS (40 MG TOTAL) BY MOUTH DAILY WITH BREAKFAST FOR 4 DAYS.  Patient taking differently: No sig reported 10/29/18  Yes Darius Mendoza MD   simvastatin (ZOCOR) 20 MG tablet TAKE ONE TABLET BY MOUTH ONE TIME DAILY 5/28/18  Yes Darius Mendoza MD   ipratropium (ATROVENT) 0.02 % nebulizer solution Take 500 mcg by nebulization every 6 (six) hours as needed for wheezing. Every 6-8 hours prn  11/5/18 Yes PROVIDER, HISTORICAL   nicotine polacrilex (COMMIT) 2 MG lozenge Apply 2 mg to cheek. 6/12/18 11/5/18  PROVIDER, HISTORICAL       Allergies: No Known Allergies    Immunizations:   Immunization History   Administered Date(s) Administered     DT (pediatric) 01/06/2005     Influenza high dose, seasonal 10/08/2015, 10/24/2016, 10/03/2017, 10/09/2018     Influenza, inj, historic,unspecified 11/20/2007, 10/23/2008, 10/19/2010, 10/20/2011     Influenza, seasonal,quad inj 6-35 mos 10/09/2012, 11/15/2013, 10/29/2014     Pneumo Conj 13-V (2010&after) 07/28/2015     Pneumo Polysac 23-V 01/01/2002, 06/22/2010      Td,adult,historic,unspecified 01/06/2005     Tdap 01/27/2015       Exam Chest clear to auscultation and percussion.  Heart tones regular rhythm without murmur rub or gallop.  Abdomen soft nontender no organomegaly.  No peritoneal signs.  Extremities free of edema cyanosis or clubbing.  Neck veins nondistended no thyromegaly or scleral icterus noted, carotids full.  Skin warm and dry easily conversant good spirited.  Normal intelligence.  Neurologically intact no gross localizing findings.  Cat scratch area lower extremity healing well bacitracin suggested not secondarily infected.  Keep covered.  Elevation.  No leg edema cyanosis or clubbing.    128/66 pulse 67 respirations 20 and unlabored O2 sats 96% on room air.  BMI ideal at 22.48.    Assessment and Plan  COPD stable.  Same meds and cares.  Cautioned regarding excessive use of prednisone for flareups of same.    Hyperlipidemia on statin therapy no history of MI or stroke.    Hypertension controlled.  128/66 no target organ damage related to same.    Depression not major no suicidal ideations better on Celexa 20 mg daily.    Cat scratch with secondary irritation but no sign of deep seeded infection cellulitis or osteomyelitis.  Bacitracin daily keep covered elevation as needed.  Encourage patient have repeat mammogram as last done October 16, 2013.  2018 healthcare quality patient assessment form completed for [facility] care.    Time: total time spent with the patient was 25 minutes of which >50% was spent in counseling and coordination of care    The following high BMI interventions were performed this visit: encouragement to exercise    Darius Mendoza MD    Patient Active Problem List   Diagnosis     Osteopenia     Chronic Major Depression     Hyperlipidemia     Chronic Bronchitis With Acute Exacerbation     Shingles     COPD (chronic obstructive pulmonary disease) (H)     Hypoxia     Depression     Tobacco abuse

## 2021-06-23 NOTE — TELEPHONE ENCOUNTER
RN cannot approve Refill Request    RN can NOT refill this medication med is not covered by policy/route to provider. Last office visit: 11/5/2018 Darius Mendoza MD Last Physical: Visit date not found Last MTM visit: Visit date not found Last visit same specialty: 11/5/2018 Darius Mendoza MD.  Next visit within 3 mo: Visit date not found  Next physical within 3 mo: Visit date not found      Carito Mckeon, [facility] Triage/Med Refill 1/13/2019    Requested Prescriptions   Pending Prescriptions Disp Refills     ipratropium-albuterol (COMBIVENT RESPIMAT)  mcg/actuation Mist inhaler [Pharmacy Med Name: COMBIVENT RESPIMAT INHAL SPRAY]  2     Sig: INHALE ONE PUFF BY MOUTH FOUR TIMES DAILY    There is no refill protocol information for this order

## 2021-06-24 NOTE — TELEPHONE ENCOUNTER
Calling to let provider know that she is feeling better today. She states she would still like the prescription called in case she has this pain again.     Pharmacy contacted.  ([facility] at patients request).     Ordered as prn after discussion with patient about her desired use and potential for causing sleepiness.       Stormy Jimenez RN [facility] Triage

## 2021-06-24 NOTE — TELEPHONE ENCOUNTER
"Pt calling that she just bent over and felt something give way.  Pt states she can walk and is currently icing lower back at belt line.  Pt states discomfort \"7-8\" on 0/10 pain scale.    Pt requesting a muscle relaxant sent to her Pharmacy.  Please advise.    Thank you  Rose Marie Reyna RN, MA  [facility] RN Triage    Reason for Disposition    Back pain    Protocols used: BACK PAIN-A-AH      "

## 2021-06-25 NOTE — TELEPHONE ENCOUNTER
RN cannot approve Refill Request    RN can NOT refill this medication med is not covered by policy/route to provider. Last office visit: 3/26/2021 Darius Mendoza MD Last Physical: 8/18/2020 Last MTM visit: Visit date not found Last visit same specialty: 3/26/2021 Darius Mendoza MD.  Next visit within 3 mo: Visit date not found  Next physical within 3 mo: Visit date not found      Linette Zhao, [facility] Triage/Med Refill 6/15/2021    Requested Prescriptions   Pending Prescriptions Disp Refills     ipratropium-albuteroL (COMBIVENT RESPIMAT)  mcg/actuation Mist inhaler [Pharmacy Med Name: COMBIVENT RESPIMAT  MCG]  3     Sig: INHALE ONE PUFF BY MOUTH FOUR TIMES DAILY       There is no refill protocol information for this order

## 2021-06-26 ENCOUNTER — HEALTH MAINTENANCE LETTER (OUTPATIENT)
Age: 81
End: 2021-06-26

## 2021-07-03 NOTE — ADDENDUM NOTE
Addendum Note by Stormy Alvarado RN at 2/17/2019 12:35 PM     Author: Stormy Alvarado RN Service: -- Author Type: Registered Nurse    Filed: 2/17/2019 12:35 PM Encounter Date: 2/16/2019 Status: Signed    : Stluka, Stormy S, RN (RN, Care Manager)    Addended by: STORMY ALVARADO on: 2/17/2019 12:35 PM        Modules accepted: Orders

## 2021-07-03 NOTE — ADDENDUM NOTE
Addendum Note by Abimael Jackson MLT at 8/18/2020  8:00 AM     Author: Abimael Jackson MLT Service: -- Author Type:     Filed: 8/18/2020  8:33 AM Encounter Date: 8/18/2020 Status: Signed    : Abimael Jackson MLT ()    Addended by: ABIMAEL JACKSON on: 8/18/2020 08:33 AM        Modules accepted: Orders

## 2021-07-03 NOTE — ADDENDUM NOTE
Addendum Note by Linette Contreras CMA at 10/3/2017  8:03 AM     Author: Linette Contreras CMA Service: -- Author Type: Certified Medical Assistant    Filed: 10/3/2017  8:03 AM Encounter Date: 10/3/2017 Status: Signed    : Linette Contreras CMA (Certified Medical Assistant)    Addended by: LINETTE CONTRERAS on: 10/3/2017 08:03 AM        Modules accepted: Orders

## 2021-07-14 ENCOUNTER — TELEPHONE (OUTPATIENT)
Dept: INTERNAL MEDICINE | Facility: CLINIC | Age: 81
End: 2021-07-14

## 2021-07-14 NOTE — TELEPHONE ENCOUNTER
Pt fell on Saturday 7/10/21 on her daily walk.  She walks 3 miles a day, a gentleman helped her and called the EMT.    She had a bad bloody nose.  She is very sore and she is grateful nothing is broken.    She has chest pain she states not related to her heart but from the fall, she feels very sore and is uncomfortable, breathing and getting into and out of bed.    Pt has an appt on Monay 7/26 but is wondering if PCP feels she should wait that long to be seen by him.    Declined to speak to triage, declined [facility] - really just wants to know what Dr. Vallejo thinks.    Pt would like a call back tomorrow when PCP returns to office

## 2021-07-20 DIAGNOSIS — J44.9 CHRONIC OBSTRUCTIVE PULMONARY DISEASE, UNSPECIFIED COPD TYPE (H): ICD-10-CM

## 2021-07-21 ENCOUNTER — RECORDS - HEALTHEAST (OUTPATIENT)
Dept: ADMINISTRATIVE | Facility: CLINIC | Age: 81
End: 2021-07-21

## 2021-07-25 RX ORDER — ALBUTEROL SULFATE 0.83 MG/ML
SOLUTION RESPIRATORY (INHALATION)
Qty: 225 ML | Refills: 1 | Status: SHIPPED | OUTPATIENT
Start: 2021-07-25 | End: 2022-08-24

## 2021-07-25 NOTE — TELEPHONE ENCOUNTER
"Last Written Prescription Date:  12/16/19  Last Fill Quantity: 270 mL,  # refills: 1   Last office visit provider:  3/26/21     Requested Prescriptions   Pending Prescriptions Disp Refills     albuterol (PROVENTIL) (2.5 MG/3ML) 0.083% neb solution [Pharmacy Med Name: ALBUTEROL SUL 2.5 MG/3 ML SOLN] 225 mL 1     Sig: INHALE 1 VIAL VIA NEBULIZER EVERY 6 HOURS FOR WHEEZING       Asthma Maintenance Inhalers - Anticholinergics Passed - 7/20/2021  2:21 PM        Passed - Patient is age 12 years or older        Passed - Recent (12 mo) or future (30 days) visit within the authorizing provider's specialty     Patient has had an office visit with the authorizing provider or a provider within the authorizing providers department within the previous 12 mos or has a future within next 30 days. See \"Patient Info\" tab in inbasket, or \"Choose Columns\" in Meds & Orders section of the refill encounter.              Passed - Medication is active on med list       Short-Acting Beta Agonist Inhalers Protocol  Passed - 7/20/2021  2:21 PM        Passed - Patient is age 12 or older        Passed - Recent (12 mo) or future (30 days) visit within the authorizing provider's specialty     Patient has had an office visit with the authorizing provider or a provider within the authorizing providers department within the previous 12 mos or has a future within next 30 days. See \"Patient Info\" tab in inbasket, or \"Choose Columns\" in Meds & Orders section of the refill encounter.              Passed - Medication is active on med list             maxine bermudez RN 07/25/21 8:53 AM  "

## 2021-07-26 ENCOUNTER — OFFICE VISIT (OUTPATIENT)
Dept: INTERNAL MEDICINE | Facility: CLINIC | Age: 81
End: 2021-07-26
Payer: COMMERCIAL

## 2021-07-26 VITALS
SYSTOLIC BLOOD PRESSURE: 96 MMHG | WEIGHT: 135 LBS | BODY MASS INDEX: 23.35 KG/M2 | OXYGEN SATURATION: 97 % | HEART RATE: 82 BPM | DIASTOLIC BLOOD PRESSURE: 56 MMHG

## 2021-07-26 DIAGNOSIS — J44.1 COPD EXACERBATION (H): ICD-10-CM
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
## 2021-07-26 PROCEDURE — 99214 OFFICE O/P EST MOD 30 MIN: CPT | Performed by: INTERNAL MEDICINE

## 2021-07-26 RX ORDER — BUDESONIDE AND FORMOTEROL FUMARATE DIHYDRATE 160; 4.5 UG/1; UG/1
AEROSOL RESPIRATORY (INHALATION)
COMMUNITY
Start: 2021-02-28 | End: 2021-09-10

## 2021-07-26 RX ORDER — PREDNISONE 20 MG/1
TABLET ORAL
Qty: 8 TABLET | Refills: 3 | Status: SHIPPED | OUTPATIENT
Start: 2021-07-26 | End: 2023-03-07

## 2021-07-26 RX ORDER — VARENICLINE TARTRATE 1 MG/1
1 TABLET, FILM COATED ORAL
COMMUNITY
Start: 2021-02-08 | End: 2021-08-09

## 2021-07-26 NOTE — PROGRESS NOTES
Assessment/Plan:    Fall with face plant and bruising maxillary areas bilaterally some blood initially.  Check x-ray of nose.  May need ear nose and throat consultation suggest arthritis strength Tylenol for pain 2 tablets 3 times a day.    COPD.  Stable.    Hypertension controlled 96/56.  No symptoms of orthostatic hypotension.    Hyperlipidemia on statin therapy no history of MI or stroke stable.    25 minutes spent on the date of the encounter doing chart review, interpretation of tests, patient visit and documentation     Subjective:  Alpa Ty is a 80 year old female presents for the following health issues fall with facial plant while walking.  Walk today.  Injury July 10, 2021.  Bruising noted over both malar eminences and maxillary areas.  Initially blood coming from nose both sides.  Trouble breathing through nose now.  Likely fracture and will require ear nose and throat consultation.  If displaced.    ROS:  No blood in stool or urine no blood in sputum medication list reviewed well-tolerated normal effects reconciled.    Objective:  BP 96/56   Pulse 82   Wt 61.2 kg (135 lb)   SpO2 97%   BMI 23.35 kg/m    Oozing noted over the malar eminences bilaterally there is poor air passage through the right side.  Chest clear heart tones normal.

## 2021-07-27 ENCOUNTER — TELEPHONE (OUTPATIENT)
Dept: FAMILY MEDICINE | Facility: CLINIC | Age: 81
End: 2021-07-27

## 2021-07-27 NOTE — TELEPHONE ENCOUNTER
----- Message from Darius Mendoza MD sent at 7/26/2021 10:01 AM CDT -----  Possible nasal fracture although it is quite subtle and mild.Patient may wish to consider seeing Dr. Jane Toure and or Dr. Derek Muir at [facility] [phone].  Please call patient for me.

## 2021-07-27 NOTE — TELEPHONE ENCOUNTER
Contacted patient and relayed PCP message.  Patient had already read it via [employer].  She is going to follow up with ENT.  Encouraged to call with any further questions or concerns.

## 2021-08-01 DIAGNOSIS — I10 ESSENTIAL (PRIMARY) HYPERTENSION: ICD-10-CM

## 2021-08-05 RX ORDER — VARENICLINE TARTRATE 1 MG/1
TABLET, FILM COATED ORAL
Qty: 90 TABLET | Refills: 1 | OUTPATIENT
Start: 2021-08-05

## 2021-08-05 NOTE — TELEPHONE ENCOUNTER
Patient will need to contact provider for new Rx.    CHANTIX 1 mg tablet (Discontinued) 90 tablet 1 6/21/2020 8/18/2020 No   Sig: TAKE 1 TABLET BY MOUTH EVERY DAY   Sent to pharmacy as: Chantix 1 mg tablet (varenicline)   Notes to Pharmacy: DX Code Needed  .   Reason for Discontinue: Therapy completed   E-Prescribing Status: Receipt confirmed by pharmacy (6/21/2020 12:54 PM CDT)   CHANTIX 1 mg tablet [[id number]]    Electronically signed by: Darius Mendoza MD on 06/21/20 1254 Status: Discontinued   Ordering user: Darius Mendoza MD 06/21/20 1254 Authorized by: Darius Mendoza MD   Frequency:  06/21/20 - 08/18/20 Released by: Darius Mendoza MD 06/21/20 1254   Discontinued by: Linette Parks CMA 08/18/20 0800 [Therapy completed]   Diagnoses  Essential hypertension [I10]   Medication comments: DX Code Needed  .

## 2021-08-08 ENCOUNTER — MYC MEDICAL ADVICE (OUTPATIENT)
Dept: INTERNAL MEDICINE | Facility: CLINIC | Age: 81
End: 2021-08-08

## 2021-08-08 DIAGNOSIS — I10 ESSENTIAL HYPERTENSION: Primary | ICD-10-CM

## 2021-08-09 RX ORDER — VARENICLINE TARTRATE 1 MG/1
1 TABLET, FILM COATED ORAL
Qty: 40 TABLET | Refills: 1 | Status: SHIPPED | OUTPATIENT
Start: 2021-08-10 | End: 2021-12-10

## 2021-08-09 NOTE — TELEPHONE ENCOUNTER
Please advise on when patient should follow up.    Patient also requesting refill on Chantix- this is not allowing me to kinjal up

## 2021-08-09 NOTE — TELEPHONE ENCOUNTER
Please ask patient to make an appointment in 3 to 4 months time.    Okay for Chantix.  With the starter kit.  Please check with pharmacy and patient that all okay with me okay to kinjal up and I will sign.

## 2021-08-10 RX ORDER — VARENICLINE TARTRATE 1 MG/1
TABLET, FILM COATED ORAL
Qty: 90 TABLET | Refills: 1 | OUTPATIENT
Start: 2021-08-10

## 2021-09-09 DIAGNOSIS — Z00.00 ENCOUNTER FOR GENERAL ADULT MEDICAL EXAMINATION WITHOUT ABNORMAL FINDINGS: ICD-10-CM

## 2021-09-10 RX ORDER — BUDESONIDE AND FORMOTEROL FUMARATE DIHYDRATE 160; 4.5 UG/1; UG/1
AEROSOL RESPIRATORY (INHALATION)
Qty: 10.2 G | Refills: 11 | Status: SHIPPED | OUTPATIENT
Start: 2021-09-10 | End: 2023-01-16

## 2021-10-16 ENCOUNTER — HEALTH MAINTENANCE LETTER (OUTPATIENT)
Age: 81
End: 2021-10-16

## 2021-10-25 ENCOUNTER — TRANSFERRED RECORDS (OUTPATIENT)
Dept: HEALTH INFORMATION MANAGEMENT | Facility: CLINIC | Age: 81
End: 2021-10-25
Payer: COMMERCIAL

## 2021-11-03 DIAGNOSIS — G25.81 RESTLESS LEGS SYNDROME (RLS): ICD-10-CM

## 2021-11-04 RX ORDER — ROPINIROLE 0.5 MG/1
TABLET, FILM COATED ORAL
Qty: 90 TABLET | Refills: 3 | OUTPATIENT
Start: 2021-11-04

## 2021-11-10 DIAGNOSIS — F33.9 MAJOR DEPRESSIVE DISORDER, RECURRENT EPISODE (H): ICD-10-CM

## 2021-11-10 DIAGNOSIS — F32.A DEPRESSION: ICD-10-CM

## 2021-11-10 RX ORDER — CITALOPRAM HYDROBROMIDE 20 MG/1
20 TABLET ORAL DAILY
Qty: 90 TABLET | Status: CANCELLED | OUTPATIENT
Start: 2021-11-10

## 2021-11-10 NOTE — TELEPHONE ENCOUNTER
PT requesting Rx transfer from [facility] in [facility], called pharmacy but no refills remaining on her Rx.    Requesting new Rx from PCP to [facility] pharmacy.    Ronald Duran, PharmD  [facility]   [facility] Pharmacist on behalf of [facility].

## 2021-11-12 DIAGNOSIS — F33.9 MAJOR DEPRESSIVE DISORDER, RECURRENT EPISODE (H): ICD-10-CM

## 2021-11-12 DIAGNOSIS — F32.A DEPRESSION: ICD-10-CM

## 2021-11-12 RX ORDER — CITALOPRAM HYDROBROMIDE 20 MG/1
TABLET ORAL
Qty: 90 TABLET | Refills: 3 | Status: SHIPPED | OUTPATIENT
Start: 2021-11-12 | End: 2021-11-26

## 2021-11-18 DIAGNOSIS — J44.9 COPD (CHRONIC OBSTRUCTIVE PULMONARY DISEASE) (H): ICD-10-CM

## 2021-11-19 RX ORDER — IPRATROPIUM BROMIDE AND ALBUTEROL 20; 100 UG/1; UG/1
SPRAY, METERED RESPIRATORY (INHALATION)
Qty: 1 EACH | Refills: 2 | Status: SHIPPED | OUTPATIENT
Start: 2021-11-19 | End: 2022-09-07

## 2021-11-19 NOTE — TELEPHONE ENCOUNTER
"Last Written Prescription Date:  06/15/2021  Last Fill Quantity: 1 inhaler,  # refills: 3   Last office visit provider:  07/26/2021 with Dr Mendoza.    Requested Prescriptions   Pending Prescriptions Disp Refills     COMBIVENT RESPIMAT  MCG/ACT inhaler [Pharmacy Med Name: COMBIVENT RESPIMAT  MCG]  1     Sig: INHALE ONE PUFF BY MOUTH FOUR TIMES DAILY       Asthma Maintenance Inhalers - Anticholinergics Passed - 11/18/2021 12:28 AM        Passed - Patient is age 12 years or older        Passed - Recent (12 mo) or future (30 days) visit within the authorizing provider's specialty     Patient has had an office visit with the authorizing provider or a provider within the authorizing providers department within the previous 12 mos or has a future within next 30 days. See \"Patient Info\" tab in inbasket, or \"Choose Columns\" in Meds & Orders section of the refill encounter.              Passed - Medication is active on med list       Short-Acting Beta Agonist Inhalers Protocol  Passed - 11/18/2021 12:28 AM        Passed - Patient is age 12 or older        Passed - Recent (12 mo) or future (30 days) visit within the authorizing provider's specialty     Patient has had an office visit with the authorizing provider or a provider within the authorizing providers department within the previous 12 mos or has a future within next 30 days. See \"Patient Info\" tab in inbasket, or \"Choose Columns\" in Meds & Orders section of the refill encounter.              Passed - Medication is active on med list       Asthma Nebs Protocol Passed - 11/18/2021 12:28 AM        Passed - Patient is age 4 years or older        Passed - Recent (12 mo) or future (30 days) visit within the authorizing provider's specialty     Patient has had an office visit with the authorizing provider or a provider within the authorizing providers department within the previous 12 mos or has a future within next 30 days. See \"Patient Info\" tab in inbasket, " "or \"Choose Columns\" in Meds & Orders section of the refill encounter.              Passed - Medication is active on med list             Nicky Kauffman 11/19/21 4:23 PM  "

## 2021-11-26 DIAGNOSIS — F32.A DEPRESSION: ICD-10-CM

## 2021-11-26 DIAGNOSIS — F33.9 MAJOR DEPRESSIVE DISORDER, RECURRENT EPISODE (H): ICD-10-CM

## 2021-11-26 RX ORDER — CITALOPRAM HYDROBROMIDE 20 MG/1
TABLET ORAL
Qty: 90 TABLET | Refills: 3 | Status: SHIPPED | OUTPATIENT
Start: 2021-11-26 | End: 2023-02-15

## 2021-12-07 ASSESSMENT — ACTIVITIES OF DAILY LIVING (ADL): CURRENT_FUNCTION: NO ASSISTANCE NEEDED

## 2021-12-10 ENCOUNTER — OFFICE VISIT (OUTPATIENT)
Dept: INTERNAL MEDICINE | Facility: CLINIC | Age: 81
End: 2021-12-10
Payer: COMMERCIAL

## 2021-12-10 ENCOUNTER — TELEPHONE (OUTPATIENT)
Dept: INTERNAL MEDICINE | Facility: CLINIC | Age: 81
End: 2021-12-10

## 2021-12-10 VITALS
HEIGHT: 65 IN | WEIGHT: 135.9 LBS | DIASTOLIC BLOOD PRESSURE: 60 MMHG | HEART RATE: 76 BPM | SYSTOLIC BLOOD PRESSURE: 110 MMHG | OXYGEN SATURATION: 97 % | BODY MASS INDEX: 22.64 KG/M2 | RESPIRATION RATE: 16 BRPM

## 2021-12-10 DIAGNOSIS — I10 ESSENTIAL HYPERTENSION: Primary | ICD-10-CM

## 2021-12-10 DIAGNOSIS — I10 ESSENTIAL HYPERTENSION: ICD-10-CM

## 2021-12-10 PROCEDURE — 99214 OFFICE O/P EST MOD 30 MIN: CPT | Performed by: INTERNAL MEDICINE

## 2021-12-10 RX ORDER — VARENICLINE TARTRATE 1 MG/1
1 TABLET, FILM COATED ORAL
Qty: 40 TABLET | Refills: 1 | Status: SHIPPED | OUTPATIENT
Start: 2021-12-11 | End: 2022-02-11

## 2021-12-10 RX ORDER — VARENICLINE TARTRATE 1 MG/1
1 TABLET, FILM COATED ORAL
Qty: 30 TABLET | Refills: 3 | Status: SHIPPED | OUTPATIENT
Start: 2021-12-10 | End: 2022-09-21

## 2021-12-10 ASSESSMENT — MIFFLIN-ST. JEOR: SCORE: 1079.38

## 2021-12-10 ASSESSMENT — PATIENT HEALTH QUESTIONNAIRE - PHQ9: SUM OF ALL RESPONSES TO PHQ QUESTIONS 1-9: 1

## 2021-12-10 NOTE — PROGRESS NOTES
"Assessment/Plan:    Hypertension outside readings reviewed controlled.    Dizziness with fall injury to the dorsum of the left foot with swelling.  Suggest rest ice compression elevation.  Offered x-ray patient declined.    Urinary incontinence chronic non-smoker rare alcohol no known drug allergies.  Offered lab testing for evaluation of urinary incontinence and balance issues patient declined recently done    Vertigo benign positional and type labyrinthitis discussed suggest Epley maneuvers plus OTC meclizine 25 mg twice daily as needed.    25 minutes spent on the date of the encounter doing chart review, patient visit and documentation     Subjective:  Alpa Ty is a 80 year old female presents for the following health issues as above memory seems to be fading    ROS:  No blood in stool or urine denies chest pain shortness of breath medication list reviewed reconciled in the chart.  Left foot swollen and slightly painful and changing gait neatly attired not in acute distress not toxic    Objective:  /60 (BP Location: Right arm, Patient Position: Sitting, Cuff Size: Adult Regular)   Pulse 76   Resp 16   Ht 1.638 m (5' 4.5\")   Wt 61.6 kg (135 lb 14.4 oz)   LMP  (LMP Unknown)   SpO2 97%   Breastfeeding No   BMI 22.97 kg/m    Chest clear to auscultation and percussion.  Heart tones regular rhythm without murmur rub or gallop.  Abdomen soft nontender no organomegaly.  No peritoneal signs.  Extremities free of edema cyanosis or clubbing.  Neck veins nondistended no thyromegaly or scleral icterus noted, carotids full.  Skin warm and dry easily conversant good spirited.  Normal intelligence.  Neurologically intact no gross localizing findings.  She appeared well her memory has declined she could remember one of the 3 items offered to her banana.  Discussed Epley maneuvers for benign positional vertigo plus meclizine OTC 25 mg twice daily as needed.  Answers for HPI/ROS submitted by the patient on " "12/7/2021  In general, how would you rate your overall physical health?: good  Frequency of exercise:: 4-5 days/week  Do you usually eat at least 4 servings of fruit and vegetables a day, include whole grains & fiber, and avoid regularly eating high fat or \"junk\" foods? : Yes  Taking medications regularly:: Yes  Medication side effects:: None  Activities of Daily Living: no assistance needed  Home safety: lack of grab bars in the bathroom  Hearing Impairment:: feel that people are mumbling or not speaking clearly, difficulty following dialogue in the theater, difficult to understand a speaker at a public meeting or [employer] service, need to ask people to speak up or repeat themselves  In the past 6 months, have you been bothered by leaking of urine?: Yes  In general, how would you rate your overall mental or emotional health?: excellent  Additional concerns today:: Yes  Duration of exercise:: Greater than 60 minutes      "

## 2021-12-10 NOTE — TELEPHONE ENCOUNTER
[facility] in [facility] calling in regards to the Chantix Rx they received for patient moments ago.  Stating the Chantix has been on back order for several months and unknown when supply will be available.  Pharmacy noting that a different Rx of Varenicling 1 mg is equivalent to Chantix and is available to order.  If provider okay, please send new Rx for the Varenicling and pharmacy can fill for patient.    Any questions, please call [phone].

## 2021-12-26 ENCOUNTER — NURSE TRIAGE (OUTPATIENT)
Dept: NURSING | Facility: CLINIC | Age: 81
End: 2021-12-26
Payer: COMMERCIAL

## 2022-01-12 DIAGNOSIS — I10 ESSENTIAL HYPERTENSION: Primary | ICD-10-CM

## 2022-01-12 RX ORDER — FLUTICASONE PROPIONATE 50 MCG
1 SPRAY, SUSPENSION (ML) NASAL DAILY
COMMUNITY
End: 2022-01-12

## 2022-01-15 RX ORDER — FLUTICASONE PROPIONATE 50 MCG
1 SPRAY, SUSPENSION (ML) NASAL DAILY
Qty: 16 G | Refills: 11 | Status: SHIPPED | OUTPATIENT
Start: 2022-01-15 | End: 2023-01-21

## 2022-01-15 NOTE — TELEPHONE ENCOUNTER
"Routing refill request to provider for review/approval because:  Medication is reported/historical    Last Written Prescription Date:  n/a  Last Fill Quantity: n/a,  # refills: n/a   Last office visit provider:  12/10/2021     Requested Prescriptions   Pending Prescriptions Disp Refills     fluticasone (FLONASE) 50 MCG/ACT nasal spray 16 g 11     Sig: Spray 1 spray into both nostrils daily       Nasal Allergy Protocol Passed - 1/12/2022 10:34 AM        Passed - Patient is age 12 or older        Passed - Recent (12 mo) or future (30 days) visit within the authorizing provider's specialty     Patient has had an office visit with the authorizing provider or a provider within the authorizing providers department within the previous 12 mos or has a future within next 30 days. See \"Patient Info\" tab in inbasket, or \"Choose Columns\" in Meds & Orders section of the refill encounter.              Passed - Medication is active on med list         Signed Prescriptions Disp Refills    fluticasone (FLONASE) 50 MCG/ACT nasal spray       Sig: Spray 1 spray into both nostrils daily       There is no refill protocol information for this order          Yesy Calix RN 01/14/22 10:57 PM  "

## 2022-02-11 ENCOUNTER — OFFICE VISIT (OUTPATIENT)
Dept: INTERNAL MEDICINE | Facility: CLINIC | Age: 82
End: 2022-02-11
Payer: COMMERCIAL

## 2022-02-11 VITALS
DIASTOLIC BLOOD PRESSURE: 64 MMHG | SYSTOLIC BLOOD PRESSURE: 114 MMHG | BODY MASS INDEX: 23.22 KG/M2 | OXYGEN SATURATION: 96 % | HEART RATE: 75 BPM | WEIGHT: 136 LBS | HEIGHT: 64 IN

## 2022-02-11 DIAGNOSIS — I10 ESSENTIAL HYPERTENSION: Primary | ICD-10-CM

## 2022-02-11 DIAGNOSIS — D64.9 ANEMIA, UNSPECIFIED TYPE: ICD-10-CM

## 2022-02-11 LAB — FERRITIN SERPL-MCNC: 28 NG/ML (ref 10–130)

## 2022-02-11 PROCEDURE — 82728 ASSAY OF FERRITIN: CPT | Performed by: INTERNAL MEDICINE

## 2022-02-11 PROCEDURE — 36415 COLL VENOUS BLD VENIPUNCTURE: CPT | Performed by: INTERNAL MEDICINE

## 2022-02-11 PROCEDURE — 99214 OFFICE O/P EST MOD 30 MIN: CPT | Performed by: INTERNAL MEDICINE

## 2022-02-11 ASSESSMENT — MIFFLIN-ST. JEOR: SCORE: 1066.89

## 2022-02-11 NOTE — LETTER
February 14, 2022      klaudia Ty  [address]  [place] [address]        Dear e,    We are writing to inform you of your test results.    ok       Resulted Orders   Ferritin   Result Value Ref Range    Ferritin 28 10 - 130 ng/mL       If you have any questions or concerns, please call the clinic at the number listed above.       Sincerely,      Darius Mendoza MD

## 2022-02-11 NOTE — PROGRESS NOTES
"Assessment/Plan:    Hypertension controlled 114/64.    Anemia on iron check ferritin level.    Vertigo improved.    Broken toe left foot healing well with little or no pain some swelling discussed importance of salt restriction leg elevation.  Time.    COPD with bronchospastic airways disease stable.    Allergy ofloxacin    25 minutes spent on the date of the encounter doing chart review, patient visit and documentation     Subjective:  Alpa Ty is a 81 year old female presents for the following health issues as above    ROS:  No blood in stool or urine denies chest pain shortness of breath med list reviewed reconciled.    Objective:  /64 (BP Location: Right arm, Patient Position: Sitting)   Pulse 75   Ht 1.626 m (5' 4\")   Wt 61.7 kg (136 lb)   LMP  (LMP Unknown)   SpO2 96%   BMI 23.34 kg/m    Chest clear to auscultation and percussion.  Heart tones regular rhythm without murmur rub or gallop.  Abdomen soft nontender no organomegaly.  No peritoneal signs.  Extremities free of edema cyanosis or clubbing.  Neck veins nondistended no thyromegaly or scleral icterus noted, carotids full.  Skin warm and dry easily conversant good spirited.  Normal intelligence.  Neurologically intact no gross localizing findings.    "

## 2022-02-15 DIAGNOSIS — I10 HTN (HYPERTENSION): ICD-10-CM

## 2022-02-15 DIAGNOSIS — I10 ESSENTIAL HYPERTENSION: ICD-10-CM

## 2022-02-15 RX ORDER — SIMVASTATIN 20 MG
TABLET ORAL
Qty: 90 TABLET | Refills: 3 | Status: SHIPPED | OUTPATIENT
Start: 2022-02-15 | End: 2023-02-13

## 2022-02-15 RX ORDER — LISINOPRIL/HYDROCHLOROTHIAZIDE 10-12.5 MG
1 TABLET ORAL DAILY
Qty: 90 TABLET | Refills: 3 | Status: SHIPPED | OUTPATIENT
Start: 2022-02-15 | End: 2023-02-13

## 2022-04-12 NOTE — TELEPHONE ENCOUNTER
4/12/2022         RE: Marlo Kearns  [address]  [place] [address]        Dear Colleague,    Thank you for referring your patient, Marlo Kearns, to the [facility] [place]. Please see a copy of my visit note below.    Marlo Kearns is a 43 year old who is being evaluated via a billable telephone visit.      What phone number would you like to be contacted at? [phone]  How would you like to obtain your AVS? Mail a copy      Medical  Weight Loss Follow-Up Diet Evaluation  Assessment:  Marlo is presenting today for a follow up weight management nutrition consultation. Pt has had an initial appointment with Dr. Naqvi.   Weight loss medication: Victoza-on hold. Hasn't had a chance to discuss with MD at this point.   Pt's weight is 390 lbs  Initial weight: 370 lbs   Weight change: 20 lbs (up-does not fluid retention in her lower extremities)     No flowsheet data found.  BMI: There is no height or weight on file to calculate BMI.  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     Borderline personality disorder (H)     Encephalopathy     Essential hypertension     Intraventricular hemorrhage (H)     Hemiplegia (H)     ACP (advance care planning)     Physical deconditioning     Malnutrition (H)     TBI (traumatic brain injury) (H)     Unspecified episodic mood disorder     Diastolic dysfunction     Seizure disorder (H)     Wound of right foot     Foot infection     Bipolar disorder (H)     Tobacco dependence     Septicemia (H)     S/P coil embolization of cerebral aneurysm     Pulmonary hypertension (H)     PTSD (post-traumatic  "SYMBICORT 160-4.5 MCG/ACT Inhaler     Edit       Summary: TAKE 2 PUFFS BY MOUTH TWICE A DAY   Start: 2/28/2021  Ord/Sold: 7/26/2021 (O)  Report  Adh:   Taking:   Long-term:   Med Dose History       Dispense as written       Patient Sig: TAKE 2 PUFFS BY MOUTH TWICE A DAY       Ordered on: 7/26/2021       Authorized by: PATIENT REPORTED          Pt reported.    Last Written Prescription Date:  ???  Last Fill Quantity: ???,  # refills: ???   Last office visit provider:  07/26/2021 with PCP.    Requested Prescriptions   Pending Prescriptions Disp Refills     SYMBICORT 160-4.5 MCG/ACT Inhaler [Pharmacy Med Name: SYMBICORT 160-4.5 MCG INHALER]  3     Sig: TAKE 2 PUFFS BY MOUTH TWICE A DAY       Inhaled Steroids Protocol Passed - 9/9/2021 10:06 AM        Passed - Patient is age 12 or older        Passed - Recent (12 mo) or future (30 days) visit within the authorizing provider's specialty     Patient has had an office visit with the authorizing provider or a provider within the authorizing providers department within the previous 12 mos or has a future within next 30 days. See \"Patient Info\" tab in inbasket, or \"Choose Columns\" in Meds & Orders section of the refill encounter.              Passed - Medication is active on med list       Long-Acting Beta Agonist Inhalers Protocol  Passed - 9/9/2021 10:06 AM        Passed - Patient is age 12 or older        Passed - Recent (12 mo) or future (30 days) visit within the authorizing provider's specialty     Patient has had an office visit with the authorizing provider or a provider within the authorizing providers department within the previous 12 mos or has a future within next 30 days. See \"Patient Info\" tab in inbasket, or \"Choose Columns\" in Meds & Orders section of the refill encounter.              Passed - Medication is active on med list             Nicky Kauffman 09/09/21 11:17 PM  " stress disorder)     CARMENZA (obstructive sleep apnea)     Neuropathy     Morbid obesity with BMI of 60.0-69.9, adult (H)     Obesity hypoventilation syndrome (H)     Microscopic hematuria     Lung nodules     Hyperglycemia     Hyperlipidemia, unspecified hyperlipidemia type     History of methamphetamine abuse (H)     Hemiparesis affecting right side as late effect of cerebrovascular accident (CVA) (H)     Heartburn     History of CVA (cerebrovascular accident)     Chest pain     Anxiety     Arteriovenous malformation of brain     Aphasia as late effect of cerebrovascular accident     Alcohol abuse, episodic     Adjustment disorder with depressed mood     Acute on chronic diastolic CHF (congestive heart failure) (H)     Lymphedema     Chronic pain     Hemiplegia of dominant side as late effect of cerebrovascular disease (H)     Impaired mobility     Mild persistent asthma without complication     Neuropathy of both feet     Shoulder joint pain     Type 2 diabetes mellitus without complication, without long-term current use of insulin (H)     Respiratory failure (H)     Nocturnal hypoxemia     Brain aneurysm     Diabetes: No     Progress on goals from last visit: Patient reports that she has been working on increasing water consumption, however does note to drinking more alcohol recently.  Patient also notes that she has been craving and eating more sweets as well.  Patient reports that she got busy and has not checked into delivery services at this point.  Patient reports that she continues to utilize the MOM's meals to help stay on track, noting that she stick with the Low Na ones.  Patient reports that her wounds seem to be improving on her lower extremities, noting that her heal is looking better however the toe still has a hole in it.    Exercise: limited due to lower extremity wounds, stretching exercises, sit-ups    Nutrition Diagnosis:    Overweight/Obesity (NC 3.3) related to overeating and poor lifestyle  habits as evidenced by patient's subjective statements and BMI of 71.5 kg/m2.        Intervention:  1. Food and/or nutrient delivery: balanced meals, adequate protein   2. Nutrition education: protein intake/supplements  3. Nutrition counseling: provided patient with support and encouragement     Monitoring/Evaluation:    Goals:  1. Work on increased protein throughout the day, aiming for 60-80 grams/day. Trial Protein Shake as meal replacement at breakfast.   2. Work on reducing sweets/high carbohydrate containing foods.     Patient to follow up in 2 week(s) with RD        Phone call duration: 18 minutes    Odette Camargo RD        Again, thank you for allowing me to participate in the care of your patient.        Sincerely,        Odette Camargo RD

## 2022-04-14 DIAGNOSIS — G25.81 RESTLESS LEGS SYNDROME (RLS): ICD-10-CM

## 2022-04-14 RX ORDER — ROPINIROLE 0.5 MG/1
0.5 TABLET, FILM COATED ORAL AT BEDTIME
Qty: 30 TABLET | Refills: 11 | Status: SHIPPED | OUTPATIENT
Start: 2022-04-14 | End: 2023-01-16

## 2022-06-13 ENCOUNTER — OFFICE VISIT (OUTPATIENT)
Dept: INTERNAL MEDICINE | Facility: CLINIC | Age: 82
End: 2022-06-13
Payer: COMMERCIAL

## 2022-06-13 VITALS
WEIGHT: 135 LBS | SYSTOLIC BLOOD PRESSURE: 124 MMHG | HEIGHT: 64 IN | HEART RATE: 76 BPM | DIASTOLIC BLOOD PRESSURE: 68 MMHG | BODY MASS INDEX: 23.05 KG/M2 | RESPIRATION RATE: 17 BRPM | TEMPERATURE: 97.8 F | OXYGEN SATURATION: 97 %

## 2022-06-13 DIAGNOSIS — I10 ESSENTIAL HYPERTENSION: Primary | ICD-10-CM

## 2022-06-13 PROCEDURE — 99213 OFFICE O/P EST LOW 20 MIN: CPT | Performed by: INTERNAL MEDICINE

## 2022-06-13 ASSESSMENT — PATIENT HEALTH QUESTIONNAIRE - PHQ9
SUM OF ALL RESPONSES TO PHQ QUESTIONS 1-9: 0
SUM OF ALL RESPONSES TO PHQ QUESTIONS 1-9: 0
10. IF YOU CHECKED OFF ANY PROBLEMS, HOW DIFFICULT HAVE THESE PROBLEMS MADE IT FOR YOU TO DO YOUR WORK, TAKE CARE OF THINGS AT HOME, OR GET ALONG WITH OTHER PEOPLE: NOT DIFFICULT AT ALL

## 2022-06-13 ASSESSMENT — PAIN SCALES - GENERAL: PAINLEVEL: NO PAIN (0)

## 2022-06-13 NOTE — PROGRESS NOTES
"Assessment/Plan:    Hypertension controlled.  124/68 no target organ damage related to same.    COPD.  Recent pulmonary medicine consultation May 6, 2022 [facility].    Hyperlipidemia on statin therapy offered lipid panel to be checked patient declined.  Patient will have annual wellness visit late fall 2022 where this will be reexamined her lipid status    15 minutes spent on the date of the encounter doing chart review, patient visit and documentation     Subjective:  Alpa Ty is a 81 year old female presents for the following health issues as above    ROS:  No blood in stool urine sputum; med list reviewed reconciled    Objective:  /68 (BP Location: Right arm, Patient Position: Sitting)   Pulse 76   Temp 97.8  F (36.6  C)   Resp 17   Ht 1.626 m (5' 4\")   Wt 61.2 kg (135 lb)   LMP  (LMP Unknown)   SpO2 97%   BMI 23.17 kg/m    Chest clear heart negative abdomen benign extremities free of edema neck veins nondistended osteoarthritic changes hands no clubbing no carotid bruits no thyromegaly    Darius Mendoza MD  Internal Medicine    Answers for HPI/ROS submitted by the patient on 6/13/2022  If you checked off any problems, how difficult have these problems made it for you to do your work, take care of things at home, or get along with other people?: Not difficult at all  PHQ9 TOTAL SCORE: 0  Current status of COPD symptom:: better  Status of fatigue and dyspnea with ambulation:: less than usual  Status of dyspnea:: same as usual  Increase or change in cough or sputum:: sometimes  Have you noticed a change in your sputum/phlegm?: No  Have you experienced a recent fever?: No  Baseline ambulation without stopping to rest:: greater than 2 miles  Number of flights of stairs without resting:: 1  Have you had any Emergency Room, Urgent Care visits or a Hospital admission because of your COPD since your last office visit?: No  How many servings of fruits and vegetables do you eat daily?: 0-1  On " average, how many sweetened beverages do you drink each day (Examples: soda, juice, sweet tea, etc.  Do NOT count diet or artificially sweetened beverages)?: 0  How many minutes a day do you exercise enough to make your heart beat faster?: 60 or more  How many days a week do you exercise enough to make your heart beat faster?: 5  How many days per week do you miss taking your medication?: 0

## 2022-08-24 DIAGNOSIS — J44.9 CHRONIC OBSTRUCTIVE PULMONARY DISEASE, UNSPECIFIED COPD TYPE (H): ICD-10-CM

## 2022-08-26 RX ORDER — ALBUTEROL SULFATE 0.83 MG/ML
SOLUTION RESPIRATORY (INHALATION)
Qty: 225 ML | Refills: 1 | Status: SHIPPED | OUTPATIENT
Start: 2022-08-26 | End: 2023-08-22

## 2022-08-26 NOTE — TELEPHONE ENCOUNTER
"Last Written Prescription Date:  7/25/21  Last Fill Quantity: 225 ml,  # refills: 1   Last office visit provider:  6/13/22     Requested Prescriptions   Pending Prescriptions Disp Refills     albuterol (PROVENTIL) (2.5 MG/3ML) 0.083% neb solution 225 mL 1       Asthma Maintenance Inhalers - Anticholinergics Passed - 8/26/2022  8:59 AM        Passed - Patient is age 12 years or older        Passed - Recent (12 mo) or future (30 days) visit within the authorizing provider's specialty     Patient has had an office visit with the authorizing provider or a provider within the authorizing providers department within the previous 12 mos or has a future within next 30 days. See \"Patient Info\" tab in inbasket, or \"Choose Columns\" in Meds & Orders section of the refill encounter.              Passed - Medication is active on med list       Short-Acting Beta Agonist Inhalers Protocol  Passed - 8/26/2022  8:59 AM        Passed - Patient is age 12 or older        Passed - Recent (12 mo) or future (30 days) visit within the authorizing provider's specialty     Patient has had an office visit with the authorizing provider or a provider within the authorizing providers department within the previous 12 mos or has a future within next 30 days. See \"Patient Info\" tab in inbasket, or \"Choose Columns\" in Meds & Orders section of the refill encounter.              Passed - Medication is active on med list             Emigdio Yun RN 08/26/22 8:59 AM  "

## 2022-09-07 DIAGNOSIS — J44.9 COPD (CHRONIC OBSTRUCTIVE PULMONARY DISEASE) (H): ICD-10-CM

## 2022-09-08 RX ORDER — IPRATROPIUM BROMIDE AND ALBUTEROL 20; 100 UG/1; UG/1
SPRAY, METERED RESPIRATORY (INHALATION)
Qty: 1 EACH | Refills: 2 | Status: SHIPPED | OUTPATIENT
Start: 2022-09-08 | End: 2023-03-17

## 2022-09-08 NOTE — TELEPHONE ENCOUNTER
"Last Written Prescription Date:  11/19/2021  Last Fill Quantity: 1,  # refills: 2   Last office visit provider:  6/13/2022     Requested Prescriptions   Pending Prescriptions Disp Refills     ipratropium-albuterol (COMBIVENT RESPIMAT)  MCG/ACT inhaler 1 each 2       Asthma Maintenance Inhalers - Anticholinergics Passed - 9/7/2022 10:33 AM        Passed - Patient is age 12 years or older        Passed - Recent (12 mo) or future (30 days) visit within the authorizing provider's specialty     Patient has had an office visit with the authorizing provider or a provider within the authorizing providers department within the previous 12 mos or has a future within next 30 days. See \"Patient Info\" tab in inbasket, or \"Choose Columns\" in Meds & Orders section of the refill encounter.              Passed - Medication is active on med list       Short-Acting Beta Agonist Inhalers Protocol  Passed - 9/7/2022 10:33 AM        Passed - Patient is age 12 or older        Passed - Recent (12 mo) or future (30 days) visit within the authorizing provider's specialty     Patient has had an office visit with the authorizing provider or a provider within the authorizing providers department within the previous 12 mos or has a future within next 30 days. See \"Patient Info\" tab in inbasket, or \"Choose Columns\" in Meds & Orders section of the refill encounter.              Passed - Medication is active on med list       Asthma Nebs Protocol Passed - 9/7/2022 10:33 AM        Passed - Patient is age 4 years or older        Passed - Recent (12 mo) or future (30 days) visit within the authorizing provider's specialty     Patient has had an office visit with the authorizing provider or a provider within the authorizing providers department within the previous 12 mos or has a future within next 30 days. See \"Patient Info\" tab in inbasket, or \"Choose Columns\" in Meds & Orders section of the refill encounter.              Passed - Medication is " active on med list             Nhung Escoto, LAVERN 09/07/22 11:55 PM

## 2022-09-19 DIAGNOSIS — I10 ESSENTIAL HYPERTENSION: ICD-10-CM

## 2022-09-21 RX ORDER — VARENICLINE TARTRATE 1 MG/1
1 TABLET, FILM COATED ORAL
Qty: 36 TABLET | Refills: 3 | Status: SHIPPED | OUTPATIENT
Start: 2022-09-21 | End: 2023-06-02

## 2022-09-21 NOTE — TELEPHONE ENCOUNTER
"Routing refill request to provider for review/approval because:  Early refill requested.    Last Written Prescription Date:  12/10/21  Last Fill Quantity: 30,  # refills: 3   Last office visit provider:  6/13/22     Requested Prescriptions   Pending Prescriptions Disp Refills     varenicline (CHANTIX) 1 MG tablet 30 tablet 3     Sig: Take 1 tablet (1 mg) by mouth three times a week       Partial Cholinergic Nicotinic Agonist Agents Passed - 9/21/2022  9:34 AM        Passed - Blood pressure under 140/90 in past 12 months     BP Readings from Last 3 Encounters:   06/13/22 124/68   02/11/22 114/64   12/10/21 110/60                 Passed - Recent (12 mo) or future (30 days) visit within the authorizing provider's specialty     Patient has had an office visit with the authorizing provider or a provider within the authorizing providers department within the previous 12 mos or has a future within next 30 days. See \"Patient Info\" tab in inbasket, or \"Choose Columns\" in Meds & Orders section of the refill encounter.              Passed - Medication is active on med list        Passed - Patient is 18 years of age or older        Passed - Patient is not pregnant        Passed - No positive pregnancy test on file in past 12 months             Emigdio Yun RN 09/21/22 9:35 AM  "

## 2022-09-25 ENCOUNTER — HEALTH MAINTENANCE LETTER (OUTPATIENT)
Age: 82
End: 2022-09-25

## 2022-10-13 ENCOUNTER — OFFICE VISIT (OUTPATIENT)
Dept: INTERNAL MEDICINE | Facility: CLINIC | Age: 82
End: 2022-10-13
Payer: COMMERCIAL

## 2022-10-13 VITALS
DIASTOLIC BLOOD PRESSURE: 83 MMHG | OXYGEN SATURATION: 98 % | HEIGHT: 64 IN | SYSTOLIC BLOOD PRESSURE: 128 MMHG | BODY MASS INDEX: 23.35 KG/M2 | HEART RATE: 63 BPM | WEIGHT: 136.8 LBS | TEMPERATURE: 98.3 F

## 2022-10-13 DIAGNOSIS — Z00.00 ROUTINE GENERAL MEDICAL EXAMINATION AT A HEALTH CARE FACILITY: Primary | ICD-10-CM

## 2022-10-13 LAB
ALBUMIN SERPL BCG-MCNC: 4.2 G/DL (ref 3.5–5.2)
ALP SERPL-CCNC: 65 U/L (ref 35–104)
ALT SERPL W P-5'-P-CCNC: 14 U/L (ref 10–35)
ANION GAP SERPL CALCULATED.3IONS-SCNC: 11 MMOL/L (ref 7–15)
AST SERPL W P-5'-P-CCNC: 28 U/L (ref 10–35)
BILIRUB SERPL-MCNC: 0.2 MG/DL
BUN SERPL-MCNC: 15.7 MG/DL (ref 8–23)
CALCIUM SERPL-MCNC: 10 MG/DL (ref 8.8–10.2)
CHLORIDE SERPL-SCNC: 102 MMOL/L (ref 98–107)
CHOLEST SERPL-MCNC: 190 MG/DL
CREAT SERPL-MCNC: 1.12 MG/DL (ref 0.51–0.95)
DEPRECATED HCO3 PLAS-SCNC: 27 MMOL/L (ref 22–29)
ERYTHROCYTE [DISTWIDTH] IN BLOOD BY AUTOMATED COUNT: 12.6 % (ref 10–15)
GFR SERPL CREATININE-BSD FRML MDRD: 49 ML/MIN/1.73M2
GLUCOSE SERPL-MCNC: 81 MG/DL (ref 70–99)
HCT VFR BLD AUTO: 40.8 % (ref 35–47)
HDLC SERPL-MCNC: 95 MG/DL
HGB BLD-MCNC: 13 G/DL (ref 11.7–15.7)
LDLC SERPL CALC-MCNC: 75 MG/DL
MCH RBC QN AUTO: 29.7 PG (ref 26.5–33)
MCHC RBC AUTO-ENTMCNC: 31.9 G/DL (ref 31.5–36.5)
MCV RBC AUTO: 93 FL (ref 78–100)
NONHDLC SERPL-MCNC: 95 MG/DL
PLATELET # BLD AUTO: 186 10E3/UL (ref 150–450)
POTASSIUM SERPL-SCNC: 4.6 MMOL/L (ref 3.4–5.3)
PROT SERPL-MCNC: 6.1 G/DL (ref 6.4–8.3)
RBC # BLD AUTO: 4.37 10E6/UL (ref 3.8–5.2)
SODIUM SERPL-SCNC: 140 MMOL/L (ref 136–145)
TRIGL SERPL-MCNC: 99 MG/DL
TSH SERPL DL<=0.005 MIU/L-ACNC: 2.14 UIU/ML (ref 0.3–4.2)
WBC # BLD AUTO: 5.4 10E3/UL (ref 4–11)

## 2022-10-13 PROCEDURE — 85027 COMPLETE CBC AUTOMATED: CPT | Performed by: INTERNAL MEDICINE

## 2022-10-13 PROCEDURE — 80053 COMPREHEN METABOLIC PANEL: CPT | Performed by: INTERNAL MEDICINE

## 2022-10-13 PROCEDURE — G0439 PPPS, SUBSEQ VISIT: HCPCS | Performed by: INTERNAL MEDICINE

## 2022-10-13 PROCEDURE — G0008 ADMIN INFLUENZA VIRUS VAC: HCPCS | Performed by: INTERNAL MEDICINE

## 2022-10-13 PROCEDURE — 84443 ASSAY THYROID STIM HORMONE: CPT | Performed by: INTERNAL MEDICINE

## 2022-10-13 PROCEDURE — 36415 COLL VENOUS BLD VENIPUNCTURE: CPT | Performed by: INTERNAL MEDICINE

## 2022-10-13 PROCEDURE — 80061 LIPID PANEL: CPT | Performed by: INTERNAL MEDICINE

## 2022-10-13 PROCEDURE — 90662 IIV NO PRSV INCREASED AG IM: CPT | Performed by: INTERNAL MEDICINE

## 2022-10-13 ASSESSMENT — ENCOUNTER SYMPTOMS
COUGH: 0
WEAKNESS: 0
CHILLS: 0
PALPITATIONS: 0
CONSTIPATION: 0
DIARRHEA: 0
SHORTNESS OF BREATH: 1
HEADACHES: 0
HEMATOCHEZIA: 0
NAUSEA: 0
ABDOMINAL PAIN: 0
HEARTBURN: 0
FREQUENCY: 0
MYALGIAS: 0
HEMATURIA: 0
NERVOUS/ANXIOUS: 0
FEVER: 0
DYSURIA: 0
SORE THROAT: 0
JOINT SWELLING: 0
BREAST MASS: 0
ARTHRALGIAS: 0
DIZZINESS: 1
PARESTHESIAS: 0
EYE PAIN: 0

## 2022-10-13 ASSESSMENT — PAIN SCALES - GENERAL: PAINLEVEL: NO PAIN (0)

## 2022-10-13 ASSESSMENT — ACTIVITIES OF DAILY LIVING (ADL): CURRENT_FUNCTION: NO ASSISTANCE NEEDED

## 2022-10-13 NOTE — PROGRESS NOTES
Annual Wellness Visit:  Alpa Ty  is a 81 year old female  who presents for an annual wellness visit.  Annual wellness visit and physician patient provider sharing was accomplished.  Today we will check hemogram comprehensive metabolic profile urinalysis lipid panel TSH.    Advance care planning done.    Falls risk assessment also accomplished.    Cognitive assessment was completed and the current provider this examiner and patient sharing was also completed.  Assessment/Plan:  Annual wellness visit and physical examination.  We had a good discussion.  Physician provider patient sharing was accomplished.    Subjective:   Medical History:    Non-smoker rare alcohol exercises regularly daily walks.    Recent scratched by her cat with some edema noted around the ankle.  Scratches over the fibula on the right side.  Discussed.  Elevation keep the scratch area abrasion clean with daily washes followed by pat dry and bacitracin topical antibiotic ointment.  No evidence for lymphangitis cellulitis per se.    Does Sadako.  No known drug allergies.  Only prior operation was 1 ovary was removed.  2 sons 2 grandchildren 2 daughters 2 great-grandchildren.   Osvaldo common-law marriage living [place] after spurt.    Prior history of COPD asthma along with anxiety better with citalopram no longer a smoker.  Retired .  Past Medical History:   Diagnosis Date     Chronic bronchitis with acute exacerbation (H)      COPD (chronic obstructive pulmonary disease) (H)      Hyperlipidemia      Current Outpatient Medications   Medication     acetaminophen (TYLENOL) 650 MG CR tablet     albuterol (PROVENTIL) (2.5 MG/3ML) 0.083% neb solution     calcium carbonate-vitamin D2 500 mg(1,250mg) -200 unit tablet     cholecalciferol, vitamin D3, (VITAMIN D3) 5,000 unit Tab     citalopram (CELEXA) 20 MG tablet     docusate sodium (COLACE) 100 MG capsule     fluticasone (FLONASE) 50 MCG/ACT nasal spray     INHALER, ASSIST  DEVICES (AEROCHAMBER PLUS Z STAT MISC)     ipratropium (ATROVENT) 0.02 % nebulizer solution     ipratropium-albuterol (COMBIVENT RESPIMAT)  MCG/ACT inhaler     Lactobacillus rhamnosus GG (CULTURELLE) 15 billion cell CpSP     lisinopril-hydrochlorothiazide (ZESTORETIC) 10-12.5 MG tablet     multivitamin with minerals (THERA-M) 9 mg iron-400 mcg Tab tablet     predniSONE (DELTASONE) 20 MG tablet     psyllium (METAMUCIL) 3.4 gram packet     rOPINIRole (REQUIP) 0.5 MG tablet     simvastatin (ZOCOR) 20 MG tablet     SYMBICORT 160-4.5 MCG/ACT Inhaler     varenicline (CHANTIX) 1 MG tablet     No current facility-administered medications for this visit.     Immunization History   Administered Date(s) Administered     COVID-19,PF,Pfizer (12+ Yrs) 2021, 2021, 10/29/2021     DT (PEDS <7y) 2005     Flu, Unspecified 2007, 10/23/2008, 10/19/2010, 10/20/2011     Influenza (High Dose) 3 valent vaccine 10/08/2015, 10/24/2016, 10/03/2017, 10/09/2018, 2019, 2020     Influenza (IIV3) PF 11/15/2005, 10/12/2006, 2007, 10/23/2008, 10/19/2010, 10/20/2011, 10/09/2012, 11/15/2013, 10/29/2014     Influenza Vaccine, 6+MO IM (QUADRIVALENT W/PRESERVATIVES) 10/09/2012, 11/15/2013, 10/29/2014     Influenza, Quad, High Dose, Pf, 65yr+ (Fluzone HD) 2020, 10/08/2021     Pneumo Conj 13-V (2010&after) 2015     Pneumococcal 23 valent 2002, 2010     Td (Adult), Adsorbed 2005     Td,adult,historic,unspecified 2005     Tdap (Adacel,Boostrix) 2015       Surgical History:  History reviewed. No pertinent surgical history.     Family History:  Mother  39 suicide overdose on meds.  Chronic obstructive lung disease TB.    Father  86 of old age.  2 sons 2 daughters 2 grandchildren and 2 great-grandchildren.  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    Social History:  Exercises regularly with daily walks.  Enjoys her  "cat    Health Maintenances:  Immunizations reviewed and up-to-date not in acute distress or toxic.    Objective:  /83 (BP Location: Right arm, Patient Position: Left side, Cuff Size: Adult Regular)   Pulse 63   Temp 98.3  F (36.8  C) (Oral)   Ht 1.626 m (5' 4\")   Wt 62.1 kg (136 lb 12.8 oz)   LMP  (LMP Unknown)   SpO2 98%   Breastfeeding No   BMI 23.48 kg/m    Easily conversant good spirited not acutely ill or toxic appearing there is an abrasion noted over the left right leg distally over the fibula posterior aspect is not secondarily infected wound care recommended bacitracin.  Minimal edema noted in the perianal distribution.  No cellulitis or lymphangitis in the right leg.  Chest was clear diminished breath sounds throughout heart tones regular rhythm distant heart tones no carotid bruits thyromegaly no neck vein distention no lymphadenopathy appreciated lymph bearing areas back straight no severe spine tenderness patient wished to defer breast pelvic and rectal examination today there are no thyroid enlargement or nodules HEENT negative belly soft nontender no liver spleen tip palpable no abdominal masses bowel sounds present hypoactive the patient wished to defer breast pelvic and rectal examination no Pap smear.    Darius Mendoza MD    Internal Medicine  Answers for HPI/ROS submitted by the patient on 10/13/2022  In general, how would you rate your overall physical health?: good  Frequency of exercise:: 6-7 days/week  Do you usually eat at least 4 servings of fruit and vegetables a day, include whole grains & fiber, and avoid regularly eating high fat or \"junk\" foods? : Yes  Taking medications regularly:: Yes  Medication side effects:: None  Activities of Daily Living: no assistance needed  Home safety: no safety concerns identified  Hearing Impairment:: no hearing concerns  In the past 6 months, have you been bothered by leaking of urine?: Yes  abdominal pain: No  Blood in stool: No  Blood " in urine: No  chest pain: No  chills: No  congestion: No  constipation: No  cough: No  diarrhea: No  dizziness: Yes  ear pain: No  eye pain: No  nervous/anxious: No  fever: No  frequency: No  genital sores: No  headaches: No  hearing loss: No  heartburn: No  arthralgias: No  joint swelling: No  peripheral edema: Yes  mood changes: No  myalgias: No  nausea: No  dysuria: No  palpitations: No  Skin sensation changes: No  sore throat: No  urgency: No  rash: No  shortness of breath: Yes  visual disturbance: No  weakness: No  pelvic pain: No  vaginal bleeding: No  vaginal discharge: No  tenderness: No  breast mass: No  breast discharge: No  In general, how would you rate your overall mental or emotional health?: excellent  Additional concerns today:: Yes  Duration of exercise:: Greater than 60 minutes

## 2022-10-13 NOTE — LETTER
October 14, 2022      klaudia Ty  [address]  [place] [address]        Dear ,    We are writing to inform you of your test results.    All good    Resulted Orders   CBC with platelets   Result Value Ref Range    WBC Count 5.4 4.0 - 11.0 10e3/uL    RBC Count 4.37 3.80 - 5.20 10e6/uL    Hemoglobin 13.0 11.7 - 15.7 g/dL    Hematocrit 40.8 35.0 - 47.0 %    MCV 93 78 - 100 fL    MCH 29.7 26.5 - 33.0 pg    MCHC 31.9 31.5 - 36.5 g/dL    RDW 12.6 10.0 - 15.0 %    Platelet Count 186 150 - 450 10e3/uL   Comprehensive metabolic panel   Result Value Ref Range    Sodium 140 136 - 145 mmol/L    Potassium 4.6 3.4 - 5.3 mmol/L    Chloride 102 98 - 107 mmol/L    Carbon Dioxide (CO2) 27 22 - 29 mmol/L    Anion Gap 11 7 - 15 mmol/L    Urea Nitrogen 15.7 8.0 - 23.0 mg/dL    Creatinine 1.12 (H) 0.51 - 0.95 mg/dL    Calcium 10.0 8.8 - 10.2 mg/dL    Glucose 81 70 - 99 mg/dL    Alkaline Phosphatase 65 35 - 104 U/L    AST 28 10 - 35 U/L    ALT 14 10 - 35 U/L    Protein Total 6.1 (L) 6.4 - 8.3 g/dL    Albumin 4.2 3.5 - 5.2 g/dL    Bilirubin Total 0.2 <=1.2 mg/dL    GFR Estimate 49 (L) >60 mL/min/1.73m2      Comment:      Effective December 21, 2021 eGFRcr in adults is calculated using the 2021 CKD-EPI creatinine equation which includes age and gender (Ashley angel al., NEJM, DOI: 10.1056/KSFDvq9635443)   Lipid panel reflex to direct LDL Fasting   Result Value Ref Range    Cholesterol 190 <200 mg/dL    Triglycerides 99 <150 mg/dL    Direct Measure HDL 95 >=50 mg/dL    LDL Cholesterol Calculated 75 <=100 mg/dL    Non HDL Cholesterol 95 <130 mg/dL    Narrative    Cholesterol  Desirable:  <200 mg/dL    Triglycerides  Normal:  Less than 150 mg/dL  Borderline High:  150-199 mg/dL  High:  200-499 mg/dL  Very High:  Greater than or equal to 500 mg/dL    Direct Measure HDL  Female:  Greater than or equal to 50 mg/dL   Male:  Greater than or equal to 40 mg/dL    LDL Cholesterol  Desirable:  <100mg/dL  Above Desirable:  100-129  mg/dL   Borderline High:  130-159 mg/dL   High:  160-189 mg/dL   Very High:  >= 190 mg/dL    Non HDL Cholesterol  Desirable:  130 mg/dL  Above Desirable:  130-159 mg/dL  Borderline High:  160-189 mg/dL  High:  190-219 mg/dL  Very High:  Greater than or equal to 220 mg/dL   TSH   Result Value Ref Range    TSH 2.14 0.30 - 4.20 uIU/mL       If you have any questions or concerns, please call the clinic at the number listed above.       Sincerely,      Darius Mendoza MD

## 2022-10-13 NOTE — PROGRESS NOTES
"SUBJECTIVE:   klaudia is a 81 year old who presents for Preventive Visit.      Patient has been advised of split billing requirements and indicates understanding: Yes  Are you in the first 12 months of your Medicare coverage?  No    Healthy Habits:     In general, how would you rate your overall health?  Good    Frequency of exercise:  6-7 days/week    Duration of exercise:  Greater than 60 minutes    Do you usually eat at least 4 servings of fruit and vegetables a day, include whole grains    & fiber and avoid regularly eating high fat or \"junk\" foods?  Yes    Taking medications regularly:  Yes    Medication side effects:  None    Ability to successfully perform activities of daily living:  No assistance needed    Home Safety:  No safety concerns identified    Hearing Impairment:  No hearing concerns    In the past 6 months, have you been bothered by leaking of urine? Yes    In general, how would you rate your overall mental or emotional health?  Excellent      PHQ-2 Total Score: 0    Additional concerns today:  Yes    Do you feel safe in your environment? Yes    Have you ever done Advance Care Planning? (For example, a Health Directive, POLST, or a discussion with a medical provider or your loved ones about your wishes): Yes, advance care planning is on file.      Fall risk  Fallen 2 or more times in the past year?: No  Any fall with injury in the past year?: No  {If any of the above assessments are answered yes, consider ordering appropriate referrals (Optional):359236::\"click delete button to remove this line now\"}  Cognitive Screening   1) Repeat 3 items (Leader, Season, Table)  {Get patient's attention, then say, \"I am going to say three words that I want you to remember now and later.  The words are Leader, Season, and Table.  Please say them for me now.\"  If pt. unable after 3 tries, go to next item}  2) Clock draw: {Say the following phrases in order: \"Please draw a clock.  Start by drawing a large [place].  " "Put all the numbers in the [place] and set the hands to show 11:10 (10 past 11).\"  If pt cannot complete in 3 minutes, stop and ask for recall items.  \"NORMAL\" test = all twelve numbers in correct location, and clock hands correctly designating 11:10}NORMAL  3) 3 item recall: {Say: \"What were the three words I asked you to remember?\"}Recalls 2 objects   Results: NORMAL clock, 1-2 items recalled: COGNITIVE IMPAIRMENT LESS LIKELY    Mini-CogTM Copyright ROSHAN Anguiano. Licensed by the author for use in [facility] [employer]; reprinted with permission ([email or web address]). All rights reserved.      Do you have sleep apnea, excessive snoring or daytime drowsiness?: no    Reviewed and updated as needed this visit by clinical staff   Tobacco  Allergies  Meds   Med Hx  Surg Hx  Fam Hx  Soc Hx        Reviewed and updated as needed this visit by Provider                 Social History     Tobacco Use     Smoking status: Former     Types: Cigarettes     Smokeless tobacco: Never     Tobacco comments:     stopped  june 2018   Substance Use Topics     Alcohol use: No     If you drink alcohol do you typically have >3 drinks per day or >7 drinks per week? Not applicable    Alcohol Use 10/13/2022   Prescreen: >3 drinks/day or >7 drinks/week? Not Applicable   Prescreen: >3 drinks/day or >7 drinks/week? -       {Outside tests to abstract? :492858}    {additional problems to add (Optional):540058}    Current providers sharing in care for this patient include: {Rooming staff:  Please update Care Team in Rooming Activity, refresh this note and then delete this statement}  Patient Care Team:  Darius Mendoza MD as PCP - General  Darius Mendoza MD as Assigned PCP    The following health maintenance items are reviewed in Epic and correct as of today:  Health Maintenance   Topic Date Due     DEXA  Never done     SPIROMETRY  Never done     ANNUAL REVIEW OF HM ORDERS  Never done     COPD ACTION PLAN  Never done     DEPRESSION ACTION " "PLAN  Never done     HEPATITIS B IMMUNIZATION (1 of 3 - 3-dose series) Never done     ZOSTER IMMUNIZATION (1 of 2) Never done     MEDICARE ANNUAL WELLNESS VISIT  08/18/2021     COVID-19 Vaccine (5 - Booster for Pfizer series) 07/29/2022     INFLUENZA VACCINE (1) 09/01/2022     PHQ-9  12/13/2022     FALL RISK ASSESSMENT  10/13/2023     DTAP/TDAP/TD IMMUNIZATION (2 - Td or Tdap) 01/27/2025     ADVANCE CARE PLANNING  10/13/2027     Pneumococcal Vaccine: 65+ Years  Completed     IPV IMMUNIZATION  Aged Out     MENINGITIS IMMUNIZATION  Aged Out     {Chronicprobdata (optional):194545}  {Decision Support (Optional):764563}    {Mammo DS 75+ :497238}  Pertinent mammograms are reviewed under the imaging tab.    Review of Systems   Constitutional: Negative for chills and fever.   HENT: Negative for congestion, ear pain, hearing loss and sore throat.    Eyes: Negative for pain and visual disturbance.   Respiratory: Positive for shortness of breath. Negative for cough.    Cardiovascular: Positive for peripheral edema. Negative for chest pain and palpitations.   Gastrointestinal: Negative for abdominal pain, constipation, diarrhea, heartburn, hematochezia and nausea.   Breasts:  Negative for tenderness, breast mass and discharge.   Genitourinary: Negative for dysuria, frequency, genital sores, hematuria, pelvic pain, urgency, vaginal bleeding and vaginal discharge.   Musculoskeletal: Negative for arthralgias, joint swelling and myalgias.   Skin: Negative for rash.   Neurological: Positive for dizziness. Negative for weakness, headaches and paresthesias.   Psychiatric/Behavioral: Negative for mood changes. The patient is not nervous/anxious.      {ROS COMP (Optional):477059}    OBJECTIVE:   /83 (BP Location: Right arm, Patient Position: Left side, Cuff Size: Adult Regular)   Pulse 63   Temp 98.3  F (36.8  C) (Oral)   Ht 1.626 m (5' 4\")   Wt 62.1 kg (136 lb 12.8 oz)   LMP  (LMP Unknown)   SpO2 98%   Breastfeeding No   " "BMI 23.48 kg/m   Estimated body mass index is 23.48 kg/m  as calculated from the following:    Height as of this encounter: 1.626 m (5' 4\").    Weight as of this encounter: 62.1 kg (136 lb 12.8 oz).  Physical Exam  {Exam (Optional) :102469}    {Diagnostic Test Results (Optional):779597::\"Diagnostic Test Results:\",\"Labs reviewed in Epic\"}    ASSESSMENT / PLAN:   {Diag Picklist:862928}    {Patient advised of split billing (Optional):785247}    COUNSELING:  {Medicare Counselin}    Estimated body mass index is 23.48 kg/m  as calculated from the following:    Height as of this encounter: 1.626 m (5' 4\").    Weight as of this encounter: 62.1 kg (136 lb 12.8 oz).    {Weight Management Plan (ACO) Complete if BMI is abnormal-  Ages 18-64  BMI >24.9.  Age 65+ with BMI <23 or >30 (Optional):427159}    She reports that she has quit smoking. Her smoking use included cigarettes. She has never used smokeless tobacco.      Appropriate preventive services were discussed with this patient, including applicable screening as appropriate for cardiovascular disease, diabetes, osteopenia/osteoporosis, and glaucoma.  As appropriate for age/gender, discussed screening for colorectal cancer, prostate cancer, breast cancer, and cervical cancer. Checklist reviewing preventive services available has been given to the patient.    Reviewed patients plan of care and provided an AVS. The {CarePlan:441274} for [place] meets the Care Plan requirement. This Care Plan has been established and reviewed with the {PATIENT, FAMILY MEMBER, CAREGIVER:221623}.    Counseling Resources:  ATP IV Guidelines  Pooled Cohorts Equation Calculator  Breast Cancer Risk Calculator  Breast Cancer: Medication to Reduce Risk  FRAX Risk Assessment  ICSI Preventive Guidelines  Dietary Guidelines for Americans, 2010  USDA's MyPlate  ASA Prophylaxis  Lung CA Screening    Darius Mendoza MD  [facility]    Identified Health Risks:  "

## 2023-01-12 DIAGNOSIS — Z00.00 ENCOUNTER FOR GENERAL ADULT MEDICAL EXAMINATION WITHOUT ABNORMAL FINDINGS: ICD-10-CM

## 2023-01-13 DIAGNOSIS — J44.9 CHRONIC OBSTRUCTIVE PULMONARY DISEASE, UNSPECIFIED COPD TYPE (H): ICD-10-CM

## 2023-01-13 DIAGNOSIS — G25.81 RESTLESS LEGS SYNDROME (RLS): ICD-10-CM

## 2023-01-15 NOTE — TELEPHONE ENCOUNTER
"Routing refill request to provider for review/approval because:  A break in medication  No order for Serevent, Striverdi, or Foradil with steroid inhaler    Last Written Prescription Date:  9/10/2021  Last Fill Quantity: 10.2g,  # refills: 11   Last office visit provider:  10/13/2022     Requested Prescriptions   Pending Prescriptions Disp Refills     SYMBICORT 160-4.5 MCG/ACT Inhaler [Pharmacy Med Name: SYMBICORT 160-4.5 MCG INHALER]  11     Sig: TAKE 2 PUFFS BY MOUTH TWICE A DAY       Long-Acting Beta Agonist Inhalers Protocol  Failed - 1/15/2023  8:31 AM        Failed - Order for Serevent, Striverdi, or Foradil and pt has steroid inhaler        Passed - Patient is age 12 or older        Passed - Recent (12 mo) or future (30 days) visit within the authorizing provider's specialty     Patient has had an office visit with the authorizing provider or a provider within the authorizing providers department within the previous 12 mos or has a future within next 30 days. See \"Patient Info\" tab in inbasket, or \"Choose Columns\" in Meds & Orders section of the refill encounter.              Passed - Medication is active on med list       Inhaled Steroids Protocol Passed - 1/15/2023  8:31 AM        Passed - Patient is age 12 or older        Passed - Recent (12 mo) or future (30 days) visit within the authorizing provider's specialty     Patient has had an office visit with the authorizing provider or a provider within the authorizing providers department within the previous 12 mos or has a future within next 30 days. See \"Patient Info\" tab in inbasket, or \"Choose Columns\" in Meds & Orders section of the refill encounter.              Passed - Medication is active on med list             Rita Nelson RN 01/15/23 8:33 AM  "

## 2023-01-16 RX ORDER — ROPINIROLE 0.5 MG/1
0.5 TABLET, FILM COATED ORAL AT BEDTIME
Qty: 30 TABLET | Refills: 1 | Status: SHIPPED | OUTPATIENT
Start: 2023-01-16 | End: 2023-03-07

## 2023-01-16 RX ORDER — BUDESONIDE AND FORMOTEROL FUMARATE DIHYDRATE 160; 4.5 UG/1; UG/1
1 AEROSOL RESPIRATORY (INHALATION) 2 TIMES DAILY
Qty: 18 G | Refills: 11 | Status: SHIPPED | OUTPATIENT
Start: 2023-01-16 | End: 2024-02-01

## 2023-01-16 NOTE — TELEPHONE ENCOUNTER
"Routing refill request to provider for review/approval because:  A break in medication    Last Written Prescription Date:  3/22/21  Last Fill Quantity: 250 ml,  # refills: 1   Last office visit provider:  10/13/22     Requested Prescriptions   Pending Prescriptions Disp Refills     ipratropium (ATROVENT) 0.02 % neb solution 250 mL 1       Short-Acting Beta Agonist Inhalers Protocol  Passed - 1/13/2023 11:04 AM        Passed - Patient is age 12 or older        Passed - Recent (12 mo) or future (30 days) visit within the authorizing provider's specialty     Patient has had an office visit with the authorizing provider or a provider within the authorizing providers department within the previous 12 mos or has a future within next 30 days. See \"Patient Info\" tab in inbasket, or \"Choose Columns\" in Meds & Orders section of the refill encounter.              Passed - Medication is active on med list       Asthma Nebs Protocol Passed - 1/13/2023 11:04 AM        Passed - Patient is age 4 years or older        Passed - Recent (12 mo) or future (30 days) visit within the authorizing provider's specialty     Patient has had an office visit with the authorizing provider or a provider within the authorizing providers department within the previous 12 mos or has a future within next 30 days. See \"Patient Info\" tab in inbasket, or \"Choose Columns\" in Meds & Orders section of the refill encounter.              Passed - Medication is active on med list         Signed Prescriptions Disp Refills    rOPINIRole (REQUIP) 0.5 MG tablet 30 tablet 1     Sig: Take 1 tablet (0.5 mg) by mouth At Bedtime       Antiparkinson's Agents Protocol Passed - 1/16/2023 10:43 AM        Passed - Blood pressure under 140/90 in past 12 months     BP Readings from Last 3 Encounters:   10/13/22 128/83   06/13/22 124/68   02/11/22 114/64                 Passed - CBC on record in past 12 months     Recent Labs   Lab Test 10/13/22  0947   WBC 5.4   RBC 4.37 " "  HGB 13.0   HCT 40.8                    Passed - ALT on record in past 12 months         Recent Labs   Lab Test 10/13/22  0947   ALT 14             Passed - Serum Creatinine on file in past 12 months     Recent Labs   Lab Test 10/13/22  0947   CR 1.12*       Ok to refill medication if creatinine is low          Passed - Medication is active on med list        Passed - Patient is age 18 or older        Passed - No active pregnancy on record        Passed - No positive pregnancy test in the past 12 months        Passed - Recent (6 mo) or future (30 days) visit within the authorizing provider's specialty     Patient had office visit in the last 6 months or has a visit in the next 30 days with authorizing provider or within the authorizing provider's specialty.  See \"Patient Info\" tab in inbasket, or \"Choose Columns\" in Meds & Orders section of the refill encounter.                 Emigdio Yun RN 01/16/23 10:44 AM  "

## 2023-01-20 DIAGNOSIS — I10 ESSENTIAL HYPERTENSION: ICD-10-CM

## 2023-01-21 RX ORDER — FLUTICASONE PROPIONATE 50 MCG
1 SPRAY, SUSPENSION (ML) NASAL DAILY
Qty: 16 G | Refills: 9 | Status: SHIPPED | OUTPATIENT
Start: 2023-01-21 | End: 2024-02-01

## 2023-01-22 NOTE — TELEPHONE ENCOUNTER
"Last Written Prescription Date:  1/15/22  Last Fill Quantity: 16,  # refills: 11   Last office visit provider:  10/13/22     Requested Prescriptions   Pending Prescriptions Disp Refills     fluticasone (FLONASE) 50 MCG/ACT nasal spray 16 g 11     Sig: Spray 1 spray into both nostrils daily       Nasal Allergy Protocol Passed - 1/20/2023  9:19 AM        Passed - Patient is age 12 or older        Passed - Recent (12 mo) or future (30 days) visit within the authorizing provider's specialty     Patient has had an office visit with the authorizing provider or a provider within the authorizing providers department within the previous 12 mos or has a future within next 30 days. See \"Patient Info\" tab in inbasket, or \"Choose Columns\" in Meds & Orders section of the refill encounter.              Passed - Medication is active on med list             Chantel Lennon RN 01/21/23 6:51 PM  "

## 2023-02-09 DIAGNOSIS — G25.81 RESTLESS LEGS SYNDROME (RLS): ICD-10-CM

## 2023-02-10 DIAGNOSIS — I10 HTN (HYPERTENSION): ICD-10-CM

## 2023-02-10 DIAGNOSIS — I10 ESSENTIAL HYPERTENSION: ICD-10-CM

## 2023-02-10 RX ORDER — ROPINIROLE 0.5 MG/1
0.5 TABLET, FILM COATED ORAL AT BEDTIME
Qty: 30 TABLET | Refills: 1 | OUTPATIENT
Start: 2023-02-10

## 2023-02-12 NOTE — TELEPHONE ENCOUNTER
"Routing refill request to provider for review/approval because:  Labs out of range:  cr    Last Written Prescription Date:  2/15/22  Last Fill Quantity: 90,  # refills: 3   Last office visit provider:  10/13/22     Requested Prescriptions   Pending Prescriptions Disp Refills     lisinopril-hydrochlorothiazide (ZESTORETIC) 10-12.5 MG tablet 90 tablet 3     Sig: Take 1 tablet by mouth daily       Diuretics (Including Combos) Protocol Failed - 2/10/2023  1:04 PM        Failed - Normal serum creatinine on file in past 12 months     Recent Labs   Lab Test 10/13/22  0947   CR 1.12*              Passed - Blood pressure under 140/90 in past 12 months     BP Readings from Last 3 Encounters:   10/13/22 128/83   06/13/22 124/68   02/11/22 114/64                 Passed - Recent (12 mo) or future (30 days) visit within the authorizing provider's specialty     Patient has had an office visit with the authorizing provider or a provider within the authorizing providers department within the previous 12 mos or has a future within next 30 days. See \"Patient Info\" tab in inbasket, or \"Choose Columns\" in Meds & Orders section of the refill encounter.              Passed - Medication is active on med list        Passed - Patient is age 18 or older        Passed - No active pregancy on record        Passed - Normal serum potassium on file in past 12 months     Recent Labs   Lab Test 10/13/22  0947   POTASSIUM 4.6                    Passed - Normal serum sodium on file in past 12 months     Recent Labs   Lab Test 10/13/22  0947                 Passed - No positive pregnancy test in past 12 months       ACE Inhibitors (Including Combos) Protocol Failed - 2/10/2023  1:04 PM        Failed - Normal serum creatinine on file in past 12 months     Recent Labs   Lab Test 10/13/22  0947   CR 1.12*       Ok to refill medication if creatinine is low          Passed - Blood pressure under 140/90 in past 12 months     BP Readings from Last 3 " "Encounters:   10/13/22 128/83   06/13/22 124/68   02/11/22 114/64                 Passed - Recent (12 mo) or future (30 days) visit within the authorizing provider's specialty     Patient has had an office visit with the authorizing provider or a provider within the authorizing providers department within the previous 12 mos or has a future within next 30 days. See \"Patient Info\" tab in inbasket, or \"Choose Columns\" in Meds & Orders section of the refill encounter.              Passed - Medication is active on med list        Passed - Patient is age 18 or older        Passed - No active pregnancy on record        Passed - Normal serum potassium on file in past 12 months     Recent Labs   Lab Test 10/13/22  0947   POTASSIUM 4.6             Passed - No positive pregnancy test within past 12 months           simvastatin (ZOCOR) 20 MG tablet 90 tablet 3     Sig: [SIMVASTATIN (ZOCOR) 20 MG TABLET] TAKE 1 TABLET BY MOUTH EVERY DAY       There is no refill protocol information for this order          Chantel Lennon RN 02/12/23 12:08 PM  "

## 2023-02-13 DIAGNOSIS — F32.A DEPRESSION: ICD-10-CM

## 2023-02-13 DIAGNOSIS — F33.9 MAJOR DEPRESSIVE DISORDER, RECURRENT EPISODE (H): ICD-10-CM

## 2023-02-13 RX ORDER — LISINOPRIL/HYDROCHLOROTHIAZIDE 10-12.5 MG
1 TABLET ORAL DAILY
Qty: 90 TABLET | Refills: 3 | Status: SHIPPED | OUTPATIENT
Start: 2023-02-13

## 2023-02-13 RX ORDER — SIMVASTATIN 20 MG
TABLET ORAL
Qty: 90 TABLET | Refills: 3 | Status: SHIPPED | OUTPATIENT
Start: 2023-02-13 | End: 2024-02-01

## 2023-02-15 RX ORDER — CITALOPRAM HYDROBROMIDE 20 MG/1
TABLET ORAL
Qty: 90 TABLET | Refills: 3 | Status: SHIPPED | OUTPATIENT
Start: 2023-02-15 | End: 2024-04-08

## 2023-02-15 NOTE — TELEPHONE ENCOUNTER
"Routing refill request to provider for review/approval because:  Labs out of range:  PHQ score less than 5 in past 6 months.    Last Written Prescription Date:  11/26/2021   Last Fill Quantity: 90,  # refills: 3   Last office visit provider:  10/13/2022     Requested Prescriptions   Pending Prescriptions Disp Refills     citalopram (CELEXA) 20 MG tablet [Pharmacy Med Name: CITALOPRAM HBR 20 MG TABLET] 90 tablet 3     Sig: TAKE 1 TABLET BY MOUTH EVERY DAY       SSRIs Protocol Failed - 2/15/2023 11:12 AM        Failed - PHQ-9 score less than 5 in past 6 months     Please review last PHQ-9 score.           Passed - Medication is active on med list        Passed - Patient is age 18 or older        Passed - No active pregnancy on record        Passed - No positive pregnancy test in last 12 months        Passed - Recent (6 mo) or future (30 days) visit within the authorizing provider's specialty     Patient had office visit in the last 6 months or has a visit in the next 30 days with authorizing provider or within the authorizing provider's specialty.  See \"Patient Info\" tab in inbasket, or \"Choose Columns\" in Meds & Orders section of the refill encounter.                 Mere New RN 02/15/23 11:20 AM  "

## 2023-03-07 ENCOUNTER — OFFICE VISIT (OUTPATIENT)
Dept: INTERNAL MEDICINE | Facility: CLINIC | Age: 83
End: 2023-03-07
Payer: COMMERCIAL

## 2023-03-07 VITALS
BODY MASS INDEX: 23.35 KG/M2 | SYSTOLIC BLOOD PRESSURE: 140 MMHG | WEIGHT: 136.8 LBS | DIASTOLIC BLOOD PRESSURE: 70 MMHG | HEART RATE: 75 BPM | HEIGHT: 64 IN | OXYGEN SATURATION: 98 % | TEMPERATURE: 98.3 F

## 2023-03-07 DIAGNOSIS — I10 ESSENTIAL HYPERTENSION: Primary | ICD-10-CM

## 2023-03-07 PROCEDURE — 99213 OFFICE O/P EST LOW 20 MIN: CPT | Performed by: INTERNAL MEDICINE

## 2023-03-07 RX ORDER — PREDNISONE 20 MG/1
TABLET ORAL
Qty: 8 TABLET | Refills: 3 | Status: SHIPPED | OUTPATIENT
Start: 2023-03-07 | End: 2024-05-01

## 2023-03-07 RX ORDER — ROPINIROLE 0.5 MG/1
0.5 TABLET, FILM COATED ORAL AT BEDTIME
Qty: 90 TABLET | Refills: 3 | Status: SHIPPED | OUTPATIENT
Start: 2023-03-07 | End: 2024-02-15

## 2023-03-07 ASSESSMENT — PATIENT HEALTH QUESTIONNAIRE - PHQ9
SUM OF ALL RESPONSES TO PHQ QUESTIONS 1-9: 0
SUM OF ALL RESPONSES TO PHQ QUESTIONS 1-9: 0

## 2023-03-07 NOTE — LETTER
My Depression Action Plan  Name: Alpa Ty   Date of Birth 1940  Date: 3/7/2023    My doctor: Darius eMndoza   My clinic: [address] [address]  [phone]          GREEN    ZONE   Good Control    What it looks like:     Things are going generally well. You have normal ups and downs. You may even feel depressed from time to time, but bad moods usually last less than a day.   What you need to do:  1. Continue to care for yourself (see self care plan)  2. Check your depression survival kit and update it as needed  3. Follow your physician s recommendations including any medication.  4. Do not stop taking medication unless you consult with your physician first.           YELLOW         ZONE Getting Worse    What it looks like:     Depression is starting to interfere with your life.     It may be hard to get out of bed; you may be starting to isolate yourself from others.    Symptoms of depression are starting to last most all day and this has happened for several days.     You may have suicidal thoughts but they are not constant.   What you need to do:     1. Call your care team. Your response to treatment will improve if you keep your care team informed of your progress. Yellow periods are signs an adjustment may need to be made.     2. Continue your self-care.  Just get dressed and ready for the day.  Don't give yourself time to talk yourself out of it.    3. Talk to someone in your support network.    4. Open up your Depression Self-Care Plan/Wellness Kit.           RED    ZONE Medical Alert - Get Help    What it looks like:     Depression is seriously interfering with your life.     You may experience these or other symptoms: You can t get out of bed most days, can t work or engage in other necessary activities, you have trouble taking care of basic hygiene, or basic responsibilities, thoughts of suicide or death  that will not go away, self-injurious behavior.     What you need to do:  1. Call your care team and request a same-day appointment. If they are not available (weekends or after hours) call your local crisis line, emergency room or 911.          Depression Self-Care Plan / Wellness Kit    Many people find that medication and therapy are helpful treatments for managing depression. In addition, making small changes to your everyday life can help to boost your mood and improve your wellbeing. Below are some tips for you to consider. Be sure to talk with your medical provider and/or behavioral health consultant if your symptoms are worsening or not improving.     Sleep   Sleep hygiene  means all of the habits that support good, restful sleep. It includes maintaining a consistent bedtime and wake time, using your bedroom only for sleeping or sex, and keeping the bedroom dark and free of distractions like a computer, smartphone, or television.     Develop a Healthy Routine  Maintain good hygiene. Get out of bed in the morning, make your bed, brush your teeth, take a shower, and get dressed. Don t spend too much time viewing media that makes you feel stressed. Find time to relax each day.    Exercise  Get some form of exercise every day. This will help reduce pain and release endorphins, the  feel good  chemicals in your brain. It can be as simple as just going for a walk or doing some gardening, anything that will get you moving.      Diet  Strive to eat healthy foods, including fruits and vegetables. Drink plenty of water. Avoid excessive sugar, caffeine, alcohol, and other mood-altering substances.     Stay Connected with Others  Stay in touch with friends and family members.    Manage Your Mood  Try deep breathing, massage therapy, biofeedback, or meditation. Take part in fun activities when you can. Try to find something to smile about each day.     Psychotherapy  Be open to working with a therapist if your provider  recommends it.     Medication  Be sure to take your medication as prescribed. Most anti-depressants need to be taken every day. It usually takes several weeks for medications to work. Not all medicines work for all people. It is important to follow-up with your provider to make sure you have a treatment plan that is working for you. Do not stop your medication abruptly without first discussing it with your provider.    Crisis Resources   These hotlines are for both adults and children. They and are open 24 hours a day, 7 days a week unless noted otherwise.      National Suicide Prevention Lifeline   988 or 8-992-622-RQXJ (1590)      Crisis Text Line    www.crisistextline.org  Text HOME to 206893 from anywhere in the [place], anytime, about any type of crisis. A live, trained crisis counselor will receive the text and respond quickly.      [place] Lifeline for LGBTQ Youth  A national crisis intervention and suicide lifeline for LGBTQ youth under 25. Provides a safe place to talk without judgement. Call 1-556.989.6509; text START to 539984 or visit www.thetrevorproject.org to talk to a trained counselor.      For [facility] crisis numbers, visit the [facility] website at:  https://[place].gov/dhs/people-we-serve/adults/health-care/mental-health/resources/crisis-contacts.jsp

## 2023-03-07 NOTE — PROGRESS NOTES
"Assessment/Plan:    Hypertension controlled recheck 134/68.  Refill and continue lisinopril HCTZ 10/12.5.  No history of target organ damage related to same.    COPD stable.  Offers no new complaints of chest pain shortness of breath cough followed by pulmonologist annually.  Vaccine status reviewed exercises daily.  Weight is ideal BMI is 23.  Excellent    Hyperlipidemia on statin therapy no history of MI or stroke associated.    Restless leg syndrome continue Requip and refill had 0.5 mg daily.    15 minutes spent on the date of the encounter doing chart review, patient visit, documentation and discussion with family     Subjective:  Alpa Ty is a 82 year old female presents for the following health issues offered lab testing patient declined    ROS:  No blood in stool or urine denies chest pain shortness of breath.  Med list reviewed reconciled in the chart.    Objective:  BP (!) 140/70 (BP Location: Right arm, Patient Position: Sitting, Cuff Size: Adult Regular)   Pulse 75   Temp 98.3  F (36.8  C) (Oral)   Ht 1.632 m (5' 4.25\")   Wt 62.1 kg (136 lb 12.8 oz)   LMP  (LMP Unknown)   SpO2 98%   BMI 23.30 kg/m    Chest clear decreased breath sounds throughout heart tones distant neck veins nondistended no carotid bruits belly soft nontender extremities free of edema cyanosis or clubbing.  Walked over 3 miles earlier this morning.  No problems with cough chest pain or exertional fatigue lightheadedness with exercise.  Exercises on a regular basis which is very helpful for COPD.    Darius Mendoza MD  Internal Medicine    Answers for HPI/ROS submitted by the patient on 3/7/2023  PHQ9 TOTAL SCORE: 0  Current status of COPD symptom:: slightly worse  Status of fatigue and dyspnea with ambulation:: more than usual  Status of dyspnea:: more than usual  Increase or change in cough or sputum:: sometimes  Have you noticed a change in your sputum/phlegm?: No  Have you experienced a recent fever?: No  Baseline " ambulation without stopping to rest:: greater than 2 miles  Number of flights of stairs without resting:: 2  Have you had any Emergency Room, Urgent Care visits or a Hospital admission because of your COPD since your last office visit?: No  How many servings of fruits and vegetables do you eat daily?: 2-3  On average, how many sweetened beverages do you drink each day (Examples: soda, juice, sweet tea, etc.  Do NOT count diet or artificially sweetened beverages)?: 0  How many minutes a day do you exercise enough to make your heart beat faster?: 60 or more  How many days a week do you exercise enough to make your heart beat faster?: 5  How many days per week do you miss taking your medication?: 0

## 2023-03-17 DIAGNOSIS — J44.9 COPD (CHRONIC OBSTRUCTIVE PULMONARY DISEASE) (H): ICD-10-CM

## 2023-03-17 RX ORDER — IPRATROPIUM BROMIDE AND ALBUTEROL 20; 100 UG/1; UG/1
SPRAY, METERED RESPIRATORY (INHALATION)
Qty: 1 EACH | Refills: 2 | Status: SHIPPED | OUTPATIENT
Start: 2023-03-17 | End: 2024-01-26

## 2023-03-17 NOTE — TELEPHONE ENCOUNTER
"Last Written Prescription Date:  9/8/2022  Last Fill Quantity: 1,  # refills: 2   Last office visit provider:  3/7/2023     Requested Prescriptions   Pending Prescriptions Disp Refills     ipratropium-albuterol (COMBIVENT RESPIMAT)  MCG/ACT inhaler 1 each 2     Sig: INHALE ONE PUFF BY MOUTH FOUR TIMES DAILY       Asthma Maintenance Inhalers - Anticholinergics Passed - 3/17/2023  2:52 PM        Passed - Patient is age 12 years or older        Passed - Recent (12 mo) or future (30 days) visit within the authorizing provider's specialty     Patient has had an office visit with the authorizing provider or a provider within the authorizing providers department within the previous 12 mos or has a future within next 30 days. See \"Patient Info\" tab in inbasket, or \"Choose Columns\" in Meds & Orders section of the refill encounter.              Passed - Medication is active on med list       Short-Acting Beta Agonist Inhalers Protocol  Passed - 3/17/2023  2:52 PM        Passed - Patient is age 12 or older        Passed - Recent (12 mo) or future (30 days) visit within the authorizing provider's specialty     Patient has had an office visit with the authorizing provider or a provider within the authorizing providers department within the previous 12 mos or has a future within next 30 days. See \"Patient Info\" tab in inbasket, or \"Choose Columns\" in Meds & Orders section of the refill encounter.              Passed - Medication is active on med list       Asthma Nebs Protocol Passed - 3/17/2023  2:52 PM        Passed - Patient is age 4 years or older        Passed - Recent (12 mo) or future (30 days) visit within the authorizing provider's specialty     Patient has had an office visit with the authorizing provider or a provider within the authorizing providers department within the previous 12 mos or has a future within next 30 days. See \"Patient Info\" tab in inbasket, or \"Choose Columns\" in Meds & Orders section of the " refill encounter.              Passed - Medication is active on med list             Nhung Escoto, LAVERN 03/17/23 4:09 PM

## 2023-04-24 ENCOUNTER — TRANSFERRED RECORDS (OUTPATIENT)
Dept: HEALTH INFORMATION MANAGEMENT | Facility: CLINIC | Age: 83
End: 2023-04-24
Payer: COMMERCIAL

## 2023-05-31 DIAGNOSIS — I10 ESSENTIAL HYPERTENSION: ICD-10-CM

## 2023-06-02 RX ORDER — VARENICLINE TARTRATE 1 MG/1
1 TABLET, FILM COATED ORAL
Qty: 36 TABLET | Refills: 3 | Status: SHIPPED | OUTPATIENT
Start: 2023-06-02 | End: 2024-04-29

## 2023-06-02 NOTE — TELEPHONE ENCOUNTER
"Routing refill request to provider for review/approval because:  bp out of range    Last Written Prescription Date:  9/21/22  Last Fill Quantity: 36,  # refills: 3   Last office visit provider:  3/7/23     Requested Prescriptions   Pending Prescriptions Disp Refills     varenicline (CHANTIX) 1 MG tablet 36 tablet 3     Sig: Take 1 tablet (1 mg) by mouth three times a week       Partial Cholinergic Nicotinic Agonist Agents Failed - 5/31/2023 11:48 AM        Failed - Blood pressure under 140/90 in past 12 months     BP Readings from Last 3 Encounters:   03/07/23 (!) 140/70   10/13/22 128/83   06/13/22 124/68                 Passed - Recent (12 mo) or future (30 days) visit within the authorizing provider's specialty     Patient has had an office visit with the authorizing provider or a provider within the authorizing providers department within the previous 12 mos or has a future within next 30 days. See \"Patient Info\" tab in inbasket, or \"Choose Columns\" in Meds & Orders section of the refill encounter.              Passed - Medication is active on med list        Passed - Patient is 18 years of age or older        Passed - Patient is not pregnant        Passed - No positive pregnancy test on file in past 12 months             Chantel Lennon RN 06/01/23 7:32 PM  "

## 2023-07-06 ENCOUNTER — TELEPHONE (OUTPATIENT)
Dept: INTERNAL MEDICINE | Facility: CLINIC | Age: 83
End: 2023-07-06
Payer: COMMERCIAL

## 2023-07-06 NOTE — TELEPHONE ENCOUNTER
Reason for Call:  Appointment Request    Patient requesting this type of appt:  Hospital/ED Follow-Up     Requested provider: Darius Mendoza or anyone else at [facility] clinic    Reason patient unable to be scheduled: Not within requested timeframe    When does patient want to be seen/preferred time: 1-5 days from discharge on 07/06/2023    Comments: Pt was discharged on 07/06/2023 & needs to be seen within 1-5 days. Was hospitalized for a COPD exacerbation.     Could we send this information to you in [email or web address] or would you prefer to receive a phone call?:   Patient would prefer a phone call   Okay to leave a detailed message?: Yes at Home number on file [phone] (home)    Call taken on 7/6/2023 at 10:54 AM by Smitha Andino

## 2023-07-06 NOTE — TELEPHONE ENCOUNTER
Left message for patient to call back to schedule appointment for hosp follow up. When patient calls back please assist in scheduling appointment with any provider at [facility]

## 2023-07-07 NOTE — TELEPHONE ENCOUNTER
Darius Mendoza MD  [facility] Internal Medicine Support Pool 3 hours ago (6:24 AM)     MB  No need for sooner visit with methan 7-13-23 and thanks! NICOLE        
Patient has set up an ED for the first available on the 13th     If provider (Harley) could look at the note and if she needs to be seen sooner get her in asap       
[facility]    
declines

## 2023-07-13 ENCOUNTER — TELEPHONE (OUTPATIENT)
Dept: INTERNAL MEDICINE | Facility: CLINIC | Age: 83
End: 2023-07-13

## 2023-07-13 ENCOUNTER — OFFICE VISIT (OUTPATIENT)
Dept: INTERNAL MEDICINE | Facility: CLINIC | Age: 83
End: 2023-07-13
Payer: COMMERCIAL

## 2023-07-13 ENCOUNTER — DOCUMENTATION ONLY (OUTPATIENT)
Dept: HOME HEALTH SERVICES | Facility: CLINIC | Age: 83
End: 2023-07-13

## 2023-07-13 VITALS
HEART RATE: 70 BPM | HEIGHT: 64 IN | WEIGHT: 133.6 LBS | DIASTOLIC BLOOD PRESSURE: 66 MMHG | SYSTOLIC BLOOD PRESSURE: 142 MMHG | OXYGEN SATURATION: 96 % | BODY MASS INDEX: 22.81 KG/M2 | TEMPERATURE: 97.7 F | RESPIRATION RATE: 16 BRPM

## 2023-07-13 DIAGNOSIS — J44.9 CHRONIC OBSTRUCTIVE PULMONARY DISEASE, UNSPECIFIED COPD TYPE (H): ICD-10-CM

## 2023-07-13 DIAGNOSIS — Z09 HOSPITAL DISCHARGE FOLLOW-UP: Primary | ICD-10-CM

## 2023-07-13 DIAGNOSIS — R20.0 NUMBNESS OF LEFT FOOT: ICD-10-CM

## 2023-07-13 DIAGNOSIS — R09.02 HYPOXIA: ICD-10-CM

## 2023-07-13 PROCEDURE — 99213 OFFICE O/P EST LOW 20 MIN: CPT | Performed by: NURSE PRACTITIONER

## 2023-07-13 ASSESSMENT — PAIN SCALES - GENERAL: PAINLEVEL: NO PAIN (0)

## 2023-07-13 NOTE — PROGRESS NOTES
RECEIVED OXYGEN INTAKE; NO WALK TEST DONE, AND THE NOTES STATE THEY WANT PATIENT TO HAVE O2 AS NEEDED UPON THE CASE THAT THE PATIENT DROPS BELOW 90%(NEEDS .PHRASE ([id number]) USING COPD FOR DX.    - I CALLED THE CLINIC BACK AND SPOKE TO LAVERN IRIZARRY AND LET HIM KNOW THAT AT THE CURRENT MOMENT PATIENT WILL NOT QUALIFY FOR HOME OXYGEN, AS MEDICARE REQUIRES A WALK TEST TO DETERMINE NEED, AND THAT ALSO THE NOTES WOULD NEED TO BE ADDENDED. HE WILL LET THE MD KNOW ASAP, AND CALL THIS BACK IN AS A NEW INTAKE ONCE THE WALK TEST IS COMPLETED.

## 2023-07-13 NOTE — TELEPHONE ENCOUNTER
Jojo from [facility] team calling to inform Meghan Richardson that insurance will not cover home oxygen.  Walk test with documentation needed and addendum to note before can be resubmitted.

## 2023-07-13 NOTE — PROGRESS NOTES
Assessment & Plan   Problem List Items Addressed This Visit        Respiratory    COPD (chronic obstructive pulmonary disease) (H)    Relevant Orders    Pulmonary Rehab Referral    Home Oxygen Order for DME - ONLY FOR DME    Hypoxia    Relevant Orders    Pulmonary Rehab Referral    Home Oxygen Order for DME - ONLY FOR DME   Other Visit Diagnoses     Hospital discharge follow-up    -  Primary    Numbness of left foot           Patient is walking two miles most mornings and reports this is not bothering her.      Patient is working with physical therapy and discussion about posture improvement and breathing.    A referral for pulmonary rehab has been placed. Patient will be contacting pulmonary rehab to schedule.     Patient completed azithromycin and prednisone.  Patient is completing neb in the morning .     Patient is using her respiratory medications as prescribed.     Numbness in the left foot which patient indicates a couple of weeks ago.  Patinet states she was standing in place for about 20 minutes and then it became numb.  Patient states it occurs when she stands on both feet the left foot is numb. Patient reports rare low back pain.  Patient indicates she did not wish to pursue anything at this time did recommend keeping follow-up with primary if she does change her mind.    Patient has a history of hypoxia with recommendation by pulmonology for patient to have oxygen available to her on a as needed basis in the event that her O2 sats fall below 90 would recommend 2 L of oxygen via nasal cannula in order to maintain O2 sats greater than 90. .           MED REC REQUIRED  Post Medication Reconciliation Status: discharge medications reconciled and changed, per note/orders  MEDICATIONS:   No orders of the defined types were placed in this encounter.    There are no discontinued medications.       - Continue other medications without change  CONSULTATION/REFERRAL to pulmonary rehab  FUTURE APPOINTMENTS:       -  "Follow-up visit in 1 week follow-up phone visit with primary.    Meghan Richardson NP  [facility] MICHELL rudolph is a 82 year old, presenting for the following health issues:  Hospital F/U        7/13/2023     8:27 AM   Additional Questions   Roomed by Brooke HECK MA   Accompanied by AKIN     \A Chronology of [place] [facility]\""       Hospital Follow-up Visit:    Hospital/Nursing Home/IP Rehab Facility: [facility]   Date of Admission: 07/04/2023  Date of Discharge: 07/06/2023  Reason(s) for Admission: COPD    Was your hospitalization related to COVID-19? No   Problems taking medications regularly:  None  Medication changes since discharge: None  Problems adhering to non-medication therapy:  None    Summary of hospitalization:  CareEverywhere information obtained and reviewed  Diagnostic Tests/Treatments reviewed.  Follow up needed: Pulmonary rehab  Other Healthcare Providers Involved in Patient s Care:         None  Update since discharge: improved.         Plan of care communicated with patient               Review of Systems   Unremarkable other than listed above and below         Objective    BP (!) 142/66 (BP Location: Right arm, Patient Position: Sitting, Cuff Size: Adult Regular)   Pulse 70   Temp 97.7  F (36.5  C) (Oral)   Resp 16   Ht 1.632 m (5' 4.25\")   Wt 60.6 kg (133 lb 9.6 oz)   LMP  (LMP Unknown)   SpO2 96%   BMI 22.75 kg/m    Body mass index is 22.75 kg/m .  Physical Exam   GENERAL: healthy, alert and no distress  EYES: Eyes grossly normal to inspection,  conjunctivae and sclerae normal  RESP: lungs clear to auscultation - no rales, rhonchi or wheezes  CV: regular rate and rhythm, normal S1 S2 no peripheral edema and peripheral pulses strong  MS: no gross musculoskeletal defects noted, no edema  SKIN: no suspicious lesions or rashes  PSYCH: mentation appears normal, affect normal/bright  Normal sensation with 5.0 7 monofilament bilateral feet pulses intact no erythema edema or ecchymosis no " skin lesions.    Office Visit on 10/13/2022   Component Date Value Ref Range Status     WBC Count 10/13/2022 5.4  4.0 - 11.0 10e3/uL Final     RBC Count 10/13/2022 4.37  3.80 - 5.20 10e6/uL Final     Hemoglobin 10/13/2022 13.0  11.7 - 15.7 g/dL Final     Hematocrit 10/13/2022 40.8  35.0 - 47.0 % Final     MCV 10/13/2022 93  78 - 100 fL Final     MCH 10/13/2022 29.7  26.5 - 33.0 pg Final     MCHC 10/13/2022 31.9  31.5 - 36.5 g/dL Final     RDW 10/13/2022 12.6  10.0 - 15.0 % Final     Platelet Count 10/13/2022 186  150 - 450 10e3/uL Final     Sodium 10/13/2022 140  136 - 145 mmol/L Final     Potassium 10/13/2022 4.6  3.4 - 5.3 mmol/L Final     Chloride 10/13/2022 102  98 - 107 mmol/L Final     Carbon Dioxide (CO2) 10/13/2022 27  22 - 29 mmol/L Final     Anion Gap 10/13/2022 11  7 - 15 mmol/L Final     Urea Nitrogen 10/13/2022 15.7  8.0 - 23.0 mg/dL Final     Creatinine 10/13/2022 1.12 (H)  0.51 - 0.95 mg/dL Final     Calcium 10/13/2022 10.0  8.8 - 10.2 mg/dL Final     Glucose 10/13/2022 81  70 - 99 mg/dL Final     Alkaline Phosphatase 10/13/2022 65  35 - 104 U/L Final     AST 10/13/2022 28  10 - 35 U/L Final     ALT 10/13/2022 14  10 - 35 U/L Final     Protein Total 10/13/2022 6.1 (L)  6.4 - 8.3 g/dL Final     Albumin 10/13/2022 4.2  3.5 - 5.2 g/dL Final     Bilirubin Total 10/13/2022 0.2  <=1.2 mg/dL Final     GFR Estimate 10/13/2022 49 (L)  >60 mL/min/1.73m2 Final    Effective December 21, 2021 eGFRcr in adults is calculated using the 2021 CKD-EPI creatinine equation which includes age and gender (Ashley et al., NEJM, DOI: 10.1056/GNPRve5753828)     Cholesterol 10/13/2022 190  <200 mg/dL Final     Triglycerides 10/13/2022 99  <150 mg/dL Final     Direct Measure HDL 10/13/2022 95  >=50 mg/dL Final     LDL Cholesterol Calculated 10/13/2022 75  <=100 mg/dL Final     Non HDL Cholesterol 10/13/2022 95  <130 mg/dL Final     TSH 10/13/2022 2.14  0.30 - 4.20 uIU/mL Final

## 2023-07-13 NOTE — TELEPHONE ENCOUNTER
Meghan Richardson, NP  [facility] 6 minutes ago (12:47 PM)     SL  Should have been completed at hospital.

## 2023-07-14 DIAGNOSIS — J44.9 CHRONIC OBSTRUCTIVE PULMONARY DISEASE, UNSPECIFIED COPD TYPE (H): Primary | ICD-10-CM

## 2023-07-20 ENCOUNTER — TELEPHONE (OUTPATIENT)
Dept: INTERNAL MEDICINE | Facility: CLINIC | Age: 83
End: 2023-07-20

## 2023-07-20 ENCOUNTER — VIRTUAL VISIT (OUTPATIENT)
Dept: INTERNAL MEDICINE | Facility: CLINIC | Age: 83
End: 2023-07-20
Payer: COMMERCIAL

## 2023-07-20 DIAGNOSIS — J44.9 CHRONIC OBSTRUCTIVE PULMONARY DISEASE, UNSPECIFIED COPD TYPE (H): Primary | ICD-10-CM

## 2023-07-20 DIAGNOSIS — N18.31 CHRONIC KIDNEY DISEASE, STAGE 3A (H): ICD-10-CM

## 2023-07-20 DIAGNOSIS — J44.9 COPD (CHRONIC OBSTRUCTIVE PULMONARY DISEASE) (H): Primary | ICD-10-CM

## 2023-07-20 PROCEDURE — 99441 PR PHYSICIAN TELEPHONE EVALUATION 5-10 MIN: CPT | Mod: 95 | Performed by: INTERNAL MEDICINE

## 2023-07-20 NOTE — TELEPHONE ENCOUNTER
Reason for Call:  Appointment Request    Patient requesting this type of appt:  RN Appointment with Johnathan or Nazanin per patient.    6 minute O2 Saturation Testing    Requested provider: MA/ISABELL/LPN Visit    Comments: Per patient she was told by Dr. Mendoza to call and ask to speak with Nazanin or Johnathan.    Patient shared that she need O2 saturation testing.  Scheduled Tuesday 07/25/23 MPLW RN     Patient requested morning appointment on Tuesday or Wednesday.    Call taken on 7/20/2023 at 12:46 PM by Chantel Grider

## 2023-07-20 NOTE — PROGRESS NOTES
Alpa Ty is a 82 year oldwho is being evaluated via a billable telephone visit.       What phone number would you like to be contacted at? [phone]      Assessment & Plan  COPD in exacerbation with recent hospital stay discharge summary reviewed from July 6, 2023.  Needs 6-minute walk test.  I suggested that the patient call our RN and nursing staff and have this done here at the [facility].     Chronic kidney disease stable emphasized the importance of normalization of blood pressure and avoidance of nonsteroidal anti-inflammatory drugs Tylenol or acetaminophen is preferred as safer for the kidney.  Also staying well-hydrated improves kidney function.  We had a good discussion.    11 minutes spent on the date of the encounter doing chart review, patient visit and documentation  I spoke with the patient directly on the phone 5 minutes.  We had a good discussion.       Subjective   Exercise tolerance is not optimal but improving.  The patient is asymptomatic without cough shortness of breath.  She has had a long history of COPD related to smoking.     Review of Systems   No blood in stool urine sputum medication list reviewed reconciled.  Less cough.       Darius Mendoza MD

## 2023-07-24 NOTE — TELEPHONE ENCOUNTER
Primary care does not perform the 6 min walk test- this is through pulmonology clinic [phone]    Order placed for 6 minute walk test- patient informed.  Will call pulmonology

## 2023-08-22 DIAGNOSIS — J44.9 CHRONIC OBSTRUCTIVE PULMONARY DISEASE, UNSPECIFIED COPD TYPE (H): ICD-10-CM

## 2023-08-22 NOTE — TELEPHONE ENCOUNTER
Received fax from pharmacy requesting refill for albuterol neb solution    Last refill: 08/26/2022  Patient last seen: 07/20/2023

## 2023-08-23 RX ORDER — ALBUTEROL SULFATE 0.83 MG/ML
SOLUTION RESPIRATORY (INHALATION)
Qty: 225 ML | Refills: 1 | Status: SHIPPED | OUTPATIENT
Start: 2023-08-23 | End: 2024-02-20

## 2023-08-23 NOTE — TELEPHONE ENCOUNTER
"  Last Written Prescription Date:  8/26/2022  Last Fill Quantity: 225 mL,  # refills: 1   Last office visit: 7/13/2023 ; last virtual visit: 7/20/2023   Future Office Visit:   Next 5 appointments (look out 90 days)      Sep 18, 2023 11:00 AM  (Arrive by 10:40 AM)  Provider Visit with Darius Mendoza MD  [facility] ([facility] ) [address] [place] [id number] [phone]                   Requested Prescriptions   Pending Prescriptions Disp Refills    albuterol (PROVENTIL) (2.5 MG/3ML) 0.083% neb solution 225 mL 1     Sig: INHALE 1 VIAL VIA NEBULIZER EVERY 6 HOURS FOR WHEEZING       Asthma Maintenance Inhalers - Anticholinergics Passed - 8/22/2023  1:44 PM        Passed - Patient is age 12 years or older        Passed - Recent (12 mo) or future (30 days) visit within the authorizing provider's specialty     Patient has had an office visit with the authorizing provider or a provider within the authorizing providers department within the previous 12 mos or has a future within next 30 days. See \"Patient Info\" tab in inbasket, or \"Choose Columns\" in Meds & Orders section of the refill encounter.              Passed - Medication is active on med list       Short-Acting Beta Agonist Inhalers Protocol  Passed - 8/22/2023  1:44 PM        Passed - Patient is age 12 or older        Passed - Recent (12 mo) or future (30 days) visit within the authorizing provider's specialty     Patient has had an office visit with the authorizing provider or a provider within the authorizing providers department within the previous 12 mos or has a future within next 30 days. See \"Patient Info\" tab in inbasket, or \"Choose Columns\" in Meds & Orders section of the refill encounter.              Passed - Medication is active on med list             Anastasia Do RN 08/23/23 4:06 AM  "

## 2023-09-13 ENCOUNTER — PATIENT OUTREACH (OUTPATIENT)
Dept: CARE COORDINATION | Facility: CLINIC | Age: 83
End: 2023-09-13
Payer: COMMERCIAL

## 2023-09-18 ENCOUNTER — OFFICE VISIT (OUTPATIENT)
Dept: INTERNAL MEDICINE | Facility: CLINIC | Age: 83
End: 2023-09-18
Payer: COMMERCIAL

## 2023-09-18 VITALS
TEMPERATURE: 97.9 F | SYSTOLIC BLOOD PRESSURE: 130 MMHG | WEIGHT: 129 LBS | RESPIRATION RATE: 16 BRPM | DIASTOLIC BLOOD PRESSURE: 80 MMHG | HEART RATE: 68 BPM | BODY MASS INDEX: 22.02 KG/M2 | OXYGEN SATURATION: 87 % | HEIGHT: 64 IN

## 2023-09-18 DIAGNOSIS — N18.31 CHRONIC KIDNEY DISEASE, STAGE 3A (H): ICD-10-CM

## 2023-09-18 DIAGNOSIS — I10 ESSENTIAL HYPERTENSION: Primary | ICD-10-CM

## 2023-09-18 PROBLEM — R09.02 HYPOXIA: Status: RESOLVED | Noted: 2017-09-11 | Resolved: 2023-09-18

## 2023-09-18 PROCEDURE — 99214 OFFICE O/P EST MOD 30 MIN: CPT | Performed by: INTERNAL MEDICINE

## 2023-09-18 RX ORDER — LOSARTAN POTASSIUM AND HYDROCHLOROTHIAZIDE 12.5; 5 MG/1; MG/1
1 TABLET ORAL DAILY
Qty: 30 TABLET | Refills: 11 | Status: SHIPPED | OUTPATIENT
Start: 2023-09-18 | End: 2023-10-10

## 2023-09-18 ASSESSMENT — PATIENT HEALTH QUESTIONNAIRE - PHQ9
10. IF YOU CHECKED OFF ANY PROBLEMS, HOW DIFFICULT HAVE THESE PROBLEMS MADE IT FOR YOU TO DO YOUR WORK, TAKE CARE OF THINGS AT HOME, OR GET ALONG WITH OTHER PEOPLE: NOT DIFFICULT AT ALL
SUM OF ALL RESPONSES TO PHQ QUESTIONS 1-9: 0
SUM OF ALL RESPONSES TO PHQ QUESTIONS 1-9: 0

## 2023-09-18 ASSESSMENT — PAIN SCALES - GENERAL: PAINLEVEL: NO PAIN (0)

## 2023-09-18 NOTE — PROGRESS NOTES
"  {PROVIDER CHARTING PREFERENCE:779495}    Tremaine rudolph is a 82 year old, presenting for the following health issues:  Recheck Medication (Med check follow up fasting )      9/18/2023    10:48 AM   Additional Questions   Roomed by cindy tejada cma       History of Present Illness       COPD:  She presents for follow up of COPD.   Overall, COPD symptoms are better since last visit. She has less than usual fatigue or shortness of breath with exertion and same as usual shortness of breath at rest.  She rarely coughs and does not have change in sputum. No recent fever. She can walk greater than 2 miles without stopping to rest. She can walk 3 or more flights of stairs without resting. The patient has had an ED, urgent care, or hospital admission because of COPD since the last visit. She states she has had 1 visit(s) to an ED, Urgent Care, or Hospital due to her COPD.    Hyperlipidemia:  She presents for follow up of hyperlipidemia.   She is not taking medication to lower cholesterol. She is not having myalgia or other side effects to statin medications.    Hypertension: She presents for follow up of hypertension.  She does not check blood pressure  regularly outside of the clinic. Outpatient blood pressures have not been over 140/90. She follows a low salt diet.     She eats 2-3 servings of fruits and vegetables daily.She consumes 0 sweetened beverage(s) daily.She exercises with enough effort to increase her heart rate 30 to 60 minutes per day.  She exercises with enough effort to increase her heart rate 6 days per week.        {SUPERLIST (Optional):319152}  {additonal problems for provider to add (Optional):706120}      Review of Systems   {ROS COMP (Optional):965245}      Objective    /80   Pulse 68   Temp 97.9  F (36.6  C)   Resp 16   Ht 1.632 m (5' 4.25\")   Wt 58.5 kg (129 lb)   LMP  (LMP Unknown)   SpO2 (!) 87%   BMI 21.97 kg/m    Body mass index is 21.97 kg/m .  Physical Exam   {Exam List " (Optional):808638}    {Diagnostic Test Results (Optional):768162}    {AMBULATORY ATTESTATION (Optional):819531}

## 2023-09-18 NOTE — PROGRESS NOTES
"Assessment/Plan:    Hypertension 160/72 start losartan/HCTZ 50/12.51 daily.    COPD with exacerbation recent hospitalization for same.  2 days.  Better now.  Hypoxemia.  Acute respiratory failure resolved.  Suggest flu vaccine RSV vaccine and updated COVID-19 vaccine when available.    Hyperlipidemia on statin therapy no history of MI or stroke.    Chronic kidney disease stable.  With next office visit we will check BUN/creatinine potassium and CO2.  We had a good discussion.  Suggest avoidance of nonsteroidal anti-inflammatory drugs.  Acetaminophen or Tylenol safer for the kidney.    25 minutes spent on the date of the encounter doing chart review, patient visit, and documentation     Subjective:  Alpa Ty is a 82 year old female presents for the following health issues recent hospital stay for COPD and exacerbation 2-day hospital stay shaking chills presentation.  Not thought to be related to an upper respiratory or lower respiratory infection.  Resolved.  Initial symptomatology shaking chills plus hypoxemia noted in the emergency room.  Treated with antibiotics and steroids.    Prior to pulmonary rehab which she very much appreciates your blood pressure was elevated.  Once as high as 160/72.  After exercise improved.  Previously was on lisinopril HCTZ.  No longer taking.  Start losartan/HCTZ 50/12.51 daily.  Recheck 4 months.  We had a good discussion.    ROS:  No blood in stool urine or sputum.  No shaking chills no chest pain not short of breath exercises regularly.  No longer a smoker.  Medication list reviewed reconciled in the chart.    Objective:  /80   Pulse 68   Temp 97.9  F (36.6  C)   Resp 16   Ht 1.632 m (5' 4.25\")   Wt 58.5 kg (129 lb)   LMP  (LMP Unknown)   SpO2 (!) 87%   BMI 21.97 kg/m    Mild pallor noted no central acrocyanosis lung sounds were diminished breath sounds throughout no rales rhonchi or wheezes heart tones regular rhythm without murmur rub or gallop no carotid " bruits thyromegaly or neck vein distention belly soft nontender extremities free of edema cyanosis or clubbing neatly attired not in acute distress or toxic.    Darius Mendoza MD  Internal Medicine  Answers submitted by the patient for this visit:  Lipid Visit (Submitted on 9/11/2023)  Chief Complaint: Chronic problems general questions HPI Form  Are you regularly taking any medication or supplement to lower your cholesterol?: No  Are you having muscle aches or other side effects that you think could be caused by your cholesterol lowering medication?: No  Patient Health Questionnaire (Submitted on 9/18/2023)  If you checked off any problems, how difficult have these problems made it for you to do your work, take care of things at home, or get along with other people?: Not difficult at all  PHQ9 TOTAL SCORE: 0  Hypertension Visit (Submitted on 9/11/2023)  Chief Complaint: Chronic problems general questions HPI Form  Do you check your blood pressure regularly outside of the clinic?: No  Are your blood pressures ever more than 140 on the top number (systolic) OR more than 90 on the bottom number (diastolic)? (For example, greater than 140/90): No  Are you following a low salt diet?: Yes  COPD (Chronic Obstructive Pulmonary Disease) Visit Questionnaire (Submitted on 9/11/2023)  Chief Complaint: Chronic problems general questions HPI Form  Current status of COPD symptom:: better  Status of fatigue and dyspnea with ambulation:: less than usual  Status of dyspnea:: same as usual  Increase or change in cough or sputum:: rarely  Have you noticed a change in your sputum/phlegm?: No  Have you experienced a recent fever?: No  Baseline ambulation without stopping to rest:: greater than 2 miles  Number of flights of stairs without resting:: 3 or more  Have you had any Emergency Room, Urgent Care visits or a Hospital admission because of your COPD since your last office visit?: Yes  How many times have you gone to the ER,  Urgent Care, or been hospitalized for COPD since your last clinic visit?: 1  General Questionnaire (Submitted on 9/11/2023)  Chief Complaint: Chronic problems general questions HPI Form  How many servings of fruits and vegetables do you eat daily?: 2-3  On average, how many sweetened beverages do you drink each day (Examples: soda, juice, sweet tea, etc.  Do NOT count diet or artificially sweetened beverages)?: 0  How many minutes a day do you exercise enough to make your heart beat faster?: 30 to 60  How many days a week do you exercise enough to make your heart beat faster?: 6

## 2023-09-27 ENCOUNTER — PATIENT OUTREACH (OUTPATIENT)
Dept: CARE COORDINATION | Facility: CLINIC | Age: 83
End: 2023-09-27
Payer: COMMERCIAL

## 2023-10-10 DIAGNOSIS — I10 ESSENTIAL HYPERTENSION: ICD-10-CM

## 2023-10-10 RX ORDER — LOSARTAN POTASSIUM AND HYDROCHLOROTHIAZIDE 12.5; 5 MG/1; MG/1
1 TABLET ORAL DAILY
Qty: 90 TABLET | Refills: 4 | Status: SHIPPED | OUTPATIENT
Start: 2023-10-10

## 2023-11-22 DIAGNOSIS — J44.9 CHRONIC OBSTRUCTIVE PULMONARY DISEASE, UNSPECIFIED COPD TYPE (H): ICD-10-CM

## 2023-11-24 ENCOUNTER — TELEPHONE (OUTPATIENT)
Dept: INTERNAL MEDICINE | Facility: CLINIC | Age: 83
End: 2023-11-24
Payer: COMMERCIAL

## 2023-11-24 DIAGNOSIS — J44.9 CHRONIC OBSTRUCTIVE PULMONARY DISEASE, UNSPECIFIED COPD TYPE (H): Primary | ICD-10-CM

## 2023-11-24 RX ORDER — AZITHROMYCIN 250 MG/1
TABLET, FILM COATED ORAL
Qty: 6 TABLET | Refills: 0 | Status: SHIPPED | OUTPATIENT
Start: 2023-11-24 | End: 2023-11-29

## 2023-11-24 NOTE — TELEPHONE ENCOUNTER
Medication Question or Refill    Contacts         Type Contact Phone/Fax    11/24/2023 07:48 AM CST Phone (Incoming) klaudia Ty (Self) [phone] (M)            What medication are you calling about (include dose and sig)?: Cough syrup and abx for cough.     Preferred Pharmacy:       [id number] - [place], [place] - [address]  [address] [place] [address]  Phone: [phone] Fax: [phone]      Controlled Substance Agreement on file:   CSA -- Patient Level:    CSA: None found at the patient level.       Who prescribed the medication?:     Do you need a refill? Yes, No    When did you use the medication last?     Patient offered an appointment? Yes: denied, said she didn't need one, that she knew what she needed    Do you have any questions or concerns?  No      Could we send this information to you in [facility] or would you prefer to receive a phone call?:   Patient would prefer a phone call   Okay to leave a detailed message?: Yes at Cell number on file:    Telephone Information:   Mobile [phone]       Cold cough dark gray stuff. Looking for abx called in with a cough syrup

## 2023-12-23 ENCOUNTER — HEALTH MAINTENANCE LETTER (OUTPATIENT)
Age: 83
End: 2023-12-23

## 2024-01-25 DIAGNOSIS — J44.9 COPD (CHRONIC OBSTRUCTIVE PULMONARY DISEASE) (H): ICD-10-CM

## 2024-01-26 RX ORDER — IPRATROPIUM BROMIDE AND ALBUTEROL 20; 100 UG/1; UG/1
SPRAY, METERED RESPIRATORY (INHALATION)
Qty: 4 G | Refills: 2 | Status: SHIPPED | OUTPATIENT
Start: 2024-01-26

## 2024-02-01 DIAGNOSIS — Z00.00 ENCOUNTER FOR GENERAL ADULT MEDICAL EXAMINATION WITHOUT ABNORMAL FINDINGS: ICD-10-CM

## 2024-02-01 DIAGNOSIS — G25.81 RESTLESS LEGS SYNDROME: ICD-10-CM

## 2024-02-01 DIAGNOSIS — I10 HTN (HYPERTENSION): ICD-10-CM

## 2024-02-01 DIAGNOSIS — I10 ESSENTIAL HYPERTENSION: ICD-10-CM

## 2024-02-01 RX ORDER — SIMVASTATIN 20 MG
TABLET ORAL
Qty: 90 TABLET | Refills: 3 | Status: SHIPPED | OUTPATIENT
Start: 2024-02-01

## 2024-02-01 RX ORDER — ROPINIROLE 0.5 MG/1
0.5 TABLET, FILM COATED ORAL AT BEDTIME
Qty: 90 TABLET | Refills: 3 | OUTPATIENT
Start: 2024-02-01

## 2024-02-01 RX ORDER — FLUTICASONE PROPIONATE 50 MCG
1 SPRAY, SUSPENSION (ML) NASAL DAILY
Qty: 16 G | Refills: 2 | Status: SHIPPED | OUTPATIENT
Start: 2024-02-01

## 2024-02-01 RX ORDER — BUDESONIDE AND FORMOTEROL FUMARATE DIHYDRATE 160; 4.5 UG/1; UG/1
1 AEROSOL RESPIRATORY (INHALATION) 2 TIMES DAILY
Qty: 10.2 G | Refills: 11 | Status: SHIPPED | OUTPATIENT
Start: 2024-02-01

## 2024-02-14 RX ORDER — ROPINIROLE 0.5 MG/1
0.5 TABLET, FILM COATED ORAL AT BEDTIME
Qty: 90 TABLET | Refills: 3 | OUTPATIENT
Start: 2024-02-14

## 2024-02-14 NOTE — TELEPHONE ENCOUNTER
Patient calling to get a medication refill on medication(s) attached    Patient needs a new prescriptions as she is changing pharmacies and they can't transfer the last bit she had refills for     Has 4 left, Would like to get before Saturday

## 2024-02-15 DIAGNOSIS — G25.81 RESTLESS LEGS SYNDROME: Primary | ICD-10-CM

## 2024-02-15 RX ORDER — ROPINIROLE 0.5 MG/1
0.5 TABLET, FILM COATED ORAL AT BEDTIME
Qty: 90 TABLET | Refills: 0 | Status: SHIPPED | OUTPATIENT
Start: 2024-02-15 | End: 2024-05-01

## 2024-02-20 DIAGNOSIS — J44.9 CHRONIC OBSTRUCTIVE PULMONARY DISEASE, UNSPECIFIED COPD TYPE (H): ICD-10-CM

## 2024-02-20 RX ORDER — ALBUTEROL SULFATE 0.83 MG/ML
SOLUTION RESPIRATORY (INHALATION)
Qty: 225 ML | Refills: 1 | Status: SHIPPED | OUTPATIENT
Start: 2024-02-20 | End: 2024-06-03

## 2024-04-07 DIAGNOSIS — F32.A DEPRESSION: ICD-10-CM

## 2024-04-07 DIAGNOSIS — F33.9 MAJOR DEPRESSIVE DISORDER, RECURRENT EPISODE (H): ICD-10-CM

## 2024-04-08 RX ORDER — CITALOPRAM HYDROBROMIDE 20 MG/1
TABLET ORAL
Qty: 30 TABLET | Refills: 0 | Status: SHIPPED | OUTPATIENT
Start: 2024-04-08 | End: 2024-04-23

## 2024-04-22 DIAGNOSIS — F33.9 MAJOR DEPRESSIVE DISORDER, RECURRENT EPISODE (H): ICD-10-CM

## 2024-04-22 DIAGNOSIS — F32.A DEPRESSION: ICD-10-CM

## 2024-04-23 RX ORDER — CITALOPRAM HYDROBROMIDE 20 MG/1
TABLET ORAL
Qty: 90 TABLET | Refills: 1 | Status: SHIPPED | OUTPATIENT
Start: 2024-04-23 | End: 2024-05-22

## 2024-04-29 ENCOUNTER — OFFICE VISIT (OUTPATIENT)
Dept: INTERNAL MEDICINE | Facility: CLINIC | Age: 84
End: 2024-04-29
Payer: COMMERCIAL

## 2024-04-29 VITALS
RESPIRATION RATE: 22 BRPM | OXYGEN SATURATION: 97 % | TEMPERATURE: 98.7 F | SYSTOLIC BLOOD PRESSURE: 138 MMHG | BODY MASS INDEX: 20.76 KG/M2 | DIASTOLIC BLOOD PRESSURE: 80 MMHG | HEART RATE: 87 BPM | WEIGHT: 121.6 LBS | HEIGHT: 64 IN

## 2024-04-29 DIAGNOSIS — Z13.220 SCREENING FOR HYPERLIPIDEMIA: ICD-10-CM

## 2024-04-29 DIAGNOSIS — Z00.00 ROUTINE GENERAL MEDICAL EXAMINATION AT A HEALTH CARE FACILITY: Primary | ICD-10-CM

## 2024-04-29 DIAGNOSIS — Z23 NEED FOR SHINGLES VACCINE: ICD-10-CM

## 2024-04-29 DIAGNOSIS — M81.8 OTHER OSTEOPOROSIS WITHOUT CURRENT PATHOLOGICAL FRACTURE: ICD-10-CM

## 2024-04-29 PROBLEM — J44.9 COPD (CHRONIC OBSTRUCTIVE PULMONARY DISEASE) (H): Status: RESOLVED | Noted: 2017-09-11 | Resolved: 2024-04-29

## 2024-04-29 LAB
ALBUMIN SERPL BCG-MCNC: 4.4 G/DL (ref 3.5–5.2)
ALBUMIN UR-MCNC: NEGATIVE MG/DL
ALP SERPL-CCNC: 57 U/L (ref 40–150)
ALT SERPL W P-5'-P-CCNC: 27 U/L (ref 0–50)
ANION GAP SERPL CALCULATED.3IONS-SCNC: 12 MMOL/L (ref 7–15)
APPEARANCE UR: CLEAR
AST SERPL W P-5'-P-CCNC: 39 U/L (ref 0–45)
BILIRUB SERPL-MCNC: 0.2 MG/DL
BILIRUB UR QL STRIP: NEGATIVE
BUN SERPL-MCNC: 17.2 MG/DL (ref 8–23)
CALCIUM SERPL-MCNC: 9.8 MG/DL (ref 8.8–10.2)
CHLORIDE SERPL-SCNC: 100 MMOL/L (ref 98–107)
CHOLEST SERPL-MCNC: 200 MG/DL
COLOR UR AUTO: YELLOW
CREAT SERPL-MCNC: 1.02 MG/DL (ref 0.51–0.95)
DEPRECATED HCO3 PLAS-SCNC: 25 MMOL/L (ref 22–29)
EGFRCR SERPLBLD CKD-EPI 2021: 54 ML/MIN/1.73M2
ERYTHROCYTE [DISTWIDTH] IN BLOOD BY AUTOMATED COUNT: 13.3 % (ref 10–15)
FASTING STATUS PATIENT QL REPORTED: ABNORMAL
GLUCOSE SERPL-MCNC: 120 MG/DL (ref 70–99)
GLUCOSE UR STRIP-MCNC: NEGATIVE MG/DL
HCT VFR BLD AUTO: 41.9 % (ref 35–47)
HDLC SERPL-MCNC: 112 MG/DL
HGB BLD-MCNC: 13.5 G/DL (ref 11.7–15.7)
HGB UR QL STRIP: NEGATIVE
KETONES UR STRIP-MCNC: 15 MG/DL
LDLC SERPL CALC-MCNC: 79 MG/DL
LEUKOCYTE ESTERASE UR QL STRIP: NEGATIVE
MCH RBC QN AUTO: 29.8 PG (ref 26.5–33)
MCHC RBC AUTO-ENTMCNC: 32.2 G/DL (ref 31.5–36.5)
MCV RBC AUTO: 93 FL (ref 78–100)
NITRATE UR QL: NEGATIVE
NONHDLC SERPL-MCNC: 88 MG/DL
PH UR STRIP: 6 [PH] (ref 5–8)
PLATELET # BLD AUTO: 160 10E3/UL (ref 150–450)
POTASSIUM SERPL-SCNC: 5 MMOL/L (ref 3.4–5.3)
PROT SERPL-MCNC: 6.7 G/DL (ref 6.4–8.3)
RBC # BLD AUTO: 4.53 10E6/UL (ref 3.8–5.2)
SODIUM SERPL-SCNC: 137 MMOL/L (ref 135–145)
SP GR UR STRIP: 1.02 (ref 1–1.03)
TRIGL SERPL-MCNC: 44 MG/DL
TSH SERPL DL<=0.005 MIU/L-ACNC: 1.09 UIU/ML (ref 0.3–4.2)
UROBILINOGEN UR STRIP-ACNC: 0.2 E.U./DL
WBC # BLD AUTO: 4.7 10E3/UL (ref 4–11)

## 2024-04-29 PROCEDURE — 84443 ASSAY THYROID STIM HORMONE: CPT | Performed by: INTERNAL MEDICINE

## 2024-04-29 PROCEDURE — 80053 COMPREHEN METABOLIC PANEL: CPT | Performed by: INTERNAL MEDICINE

## 2024-04-29 PROCEDURE — 36415 COLL VENOUS BLD VENIPUNCTURE: CPT | Performed by: INTERNAL MEDICINE

## 2024-04-29 PROCEDURE — 80061 LIPID PANEL: CPT | Performed by: INTERNAL MEDICINE

## 2024-04-29 PROCEDURE — 81003 URINALYSIS AUTO W/O SCOPE: CPT | Performed by: INTERNAL MEDICINE

## 2024-04-29 PROCEDURE — 85027 COMPLETE CBC AUTOMATED: CPT | Performed by: INTERNAL MEDICINE

## 2024-04-29 PROCEDURE — G0439 PPPS, SUBSEQ VISIT: HCPCS | Performed by: INTERNAL MEDICINE

## 2024-04-29 SDOH — HEALTH STABILITY: PHYSICAL HEALTH: ON AVERAGE, HOW MANY DAYS PER WEEK DO YOU ENGAGE IN MODERATE TO STRENUOUS EXERCISE (LIKE A BRISK WALK)?: 6 DAYS

## 2024-04-29 SDOH — HEALTH STABILITY: PHYSICAL HEALTH: ON AVERAGE, HOW MANY MINUTES DO YOU ENGAGE IN EXERCISE AT THIS LEVEL?: 100 MIN

## 2024-04-29 ASSESSMENT — SOCIAL DETERMINANTS OF HEALTH (SDOH): HOW OFTEN DO YOU GET TOGETHER WITH FRIENDS OR RELATIVES?: ONCE A WEEK

## 2024-04-29 NOTE — PROGRESS NOTES
Annual Wellness Visit:  Alpa Ty  is a 83 year old female  who presents for an annual wellness visit.  Annual wellness visit and physical examination.  Position and patient sharing was accomplished today.    I do not prescribe this patient opioids the patient does not receive.  The patient's provider list includes only the patient's cognitive assessment was done the patient was able to draw the base of an analog clock plus remember 3 items.  Functional capacity ADL's and safety at home were assessed and all clear today we checked a DEXA bone density scan plus hemogram comprehensive metabolic profile lipid panel TSH urinalysis.  Emotional mental health the patient was good we saw no deficits or problem in that regard.    The patient's blood pressure was rechecked 138/80 initially 154/84 the patient has had a recent flare of COPD she is followed by pulmonologist.  Prednisone has been used with benefit along with Symbicort inhalers as well as albuterol inhalers and Atrovent inhalers and Combivent inhalers.  The patient feels congested in her chest and has a cough but not in upper respiratory or lower respiratory infection she has been seen recently by pulmonologist and she seems to be getting better.    Advance care planning done.    Falls risk assessment also accomplished.    Cognitive assessment was completed and the current provider this examiner and patient sharing was also completed.  Assessment/Plan:  Annual wellness visit and physical examination.    Subjective:   Medical History:    Non-smoker.  Recheck blood pressure 138/80 initially 154/84.  The patient requested a DEXA bone density scan she declined mammographic testing.  She does not use alcohol she has no drug allergies.  Her chart lists ofloxacin as an allergy.  Unspecified.    Ovarian cyst removed by Dr. Peter Tucker out .  It was benign.  Treated for COPD hypertension and hyperlipidemia along with anxiety.  She walks daily for pulmonary  health about 3-1/2 to 4 miles each day rain or shine without difficulty.  Her pulmonologist is Dr. Trivedi of [facility] in [facility].  Past Medical History:   Diagnosis Date    Chronic bronchitis with acute exacerbation (H)     COPD (chronic obstructive pulmonary disease) (H)     Hyperlipidemia      Current Outpatient Medications   Medication Sig Dispense Refill    acetaminophen (TYLENOL) 650 MG CR tablet [ACETAMINOPHEN (TYLENOL) 650 MG CR TABLET] Take 650 mg by mouth at bedtime.      albuterol (PROVENTIL) (2.5 MG/3ML) 0.083% neb solution INHALE 1 VIAL VIA NEBULIZER EVERY 6 HOURS FOR WHEEZING 225 mL 1    budesonide-formoterol (SYMBICORT) 160-4.5 MCG/ACT Inhaler Inhale 1 puff into the lungs 2 times daily Three times weekly 10.2 g 11    calcium carbonate-vitamin D2 500 mg(1,250mg) -200 unit tablet [CALCIUM CARBONATE-VITAMIN D2 500 MG(1,250MG) -200 UNIT TABLET] Take 1 tablet by mouth daily.      cholecalciferol, vitamin D3, (VITAMIN D3) 5,000 unit Tab [CHOLECALCIFEROL, VITAMIN D3, (VITAMIN D3) 5,000 UNIT TAB] Take by mouth.      citalopram (CELEXA) 20 MG tablet TAKE 1 TABLET BY MOUTH EVERY DAY 90 tablet 1    docusate sodium (COLACE) 100 MG capsule [DOCUSATE SODIUM (COLACE) 100 MG CAPSULE] Take 100 mg by mouth 2 (two) times a day.      fluticasone (FLONASE) 50 MCG/ACT nasal spray INSTILL 1 SPRAY INTO BOTH NOSTRILS DAILY 16 g 2    INHALER, ASSIST DEVICES (AEROCHAMBER PLUS Z STAT MISC) [INHALER, ASSIST DEVICES (AEROCHAMBER PLUS Z STAT MISC)] Use As Directed.      ipratropium (ATROVENT) 0.02 % neb solution INHALE 1 VIAL VIA NEBULIZER EVERY SIX TO EIGHT HOURS AS DIRECTED 225 mL 1    ipratropium-albuterol (COMBIVENT RESPIMAT)  MCG/ACT inhaler INHALE 1 PUFF BY MOUTH 4 TIMES A DAY 4 g 2    Lactobacillus rhamnosus GG (CULTURELLE) 15 billion cell CpSP [LACTOBACILLUS RHAMNOSUS GG (CULTURELLE) 15 BILLION CELL CPSP] Take 1 capsule by mouth 2 (two) times a day with meals.      lisinopril-hydrochlorothiazide  (ZESTORETIC) 10-12.5 MG tablet Take 1 tablet by mouth daily 90 tablet 3    losartan-hydrochlorothiazide (HYZAAR) 50-12.5 MG tablet TAKE 1 TABLET BY MOUTH EVERY DAY 90 tablet 4    multivitamin with minerals (THERA-M) 9 mg iron-400 mcg Tab tablet [MULTIVITAMIN WITH MINERALS (THERA-M) 9 MG IRON-400 MCG TAB TABLET] Take 1 tablet by mouth at bedtime.      predniSONE (DELTASONE) 20 MG tablet [PREDNISONE (DELTASONE) 20 MG TABLET] TAKE 2 TABLETS (40 MG TOTAL) BY MOUTH DAILY WITH BREAKFAST FOR 4 DAYS. 8 tablet 3    rOPINIRole (REQUIP) 0.5 MG tablet Take 1 tablet (0.5 mg) by mouth at bedtime 90 tablet 0    simvastatin (ZOCOR) 20 MG tablet TAKE 1 TABLET BY MOUTH EVERY DAY 90 tablet 3    psyllium (METAMUCIL) 3.4 gram packet [PSYLLIUM (METAMUCIL) 3.4 GRAM PACKET] Take 1 packet by mouth daily. (Patient not taking: Reported on 4/29/2024)      varenicline (CHANTIX) 1 MG tablet Take 1 tablet (1 mg) by mouth three times a week (Patient not taking: Reported on 9/18/2023) 36 tablet 3     No current facility-administered medications for this visit.     Immunization History   Administered Date(s) Administered    COVID-19 MONOVALENT 12+ (Pfizer) 03/02/2021, 03/23/2021, 10/29/2021    COVID-19 Monovalent 12+ (Pfizer 2022) 06/03/2022    DT (PEDS <7y) 01/06/2005    Flu, Unspecified 11/20/2007, 10/23/2008, 10/19/2010, 10/20/2011, 10/09/2012, 11/15/2013, 09/29/2020    Influenza (High Dose) 3 valent vaccine 10/08/2015, 10/24/2016, 10/03/2017, 10/09/2018, 09/23/2019, 09/29/2020    Influenza (IIV3) PF 11/15/2005, 10/12/2006, 11/20/2007, 10/23/2008, 10/19/2010, 10/20/2011, 10/09/2012, 11/15/2013, 10/29/2014    Influenza Vaccine 65+ (FLUAD) 10/13/2022    Influenza Vaccine 65+ (Fluzone HD) 09/29/2020, 10/08/2021, 10/13/2022, 10/24/2023    Influenza Vaccine >6 months,quad, PF 10/29/2014    Influenza Vaccine, 6+MO IM (QUADRIVALENT W/PRESERVATIVES) 10/09/2012, 11/15/2013, 10/29/2014    Pneumo Conj 13-V (2010&after) 07/28/2015    Pneumococcal 20 valent  "Conjugate (Prevnar 20) 10/24/2023    Pneumococcal 23 valent 2002, 2010    RSV Vaccine (Arexvy) 10/17/2023    TDAP (Adacel,Boostrix) 2015    Td (Adult), Adsorbed 2005    Td,adult,historic,unspecified 2005    Tdap (Adult) Unspecified Formulation 2005    Zoster recombinant adjuvanted (SHINGRIX) 10/17/2023       Surgical History:  No past surgical history on file.     Family History:  Mother  age 78 she had pneumonia for 2 years of her life she was in a [facility] in [facility].  Her father  age 72 complications of old age.  No siblings she has 4 children 4 grandchildren 1 is getting a PhD in astrophysics.  [employer].  Undergraduate degree [place].    Social History:  Exercises on a regular basis 3-1/2 to 4 miles per day.  Walking.  Lives with her  in [place] [facility] in the [facility] area.  Since her  was in a motor vehicle accident mid 2024 they no longer have a car and they get around by Uber or lift or a bus or a cab.    Health Maintenances:  Immunizations are reviewed and up-to-date as are vaccine status.  It was recommended that she have colonoscopy and mammographic testing but she declined.    Objective:  /80 (BP Location: Right arm, Patient Position: Sitting, Cuff Size: Adult Regular)   Pulse 87   Temp 98.7  F (37.1  C) (Oral)   Resp 22   Ht 1.632 m (5' 4.25\")   Wt 55.2 kg (121 lb 9.6 oz)   LMP  (LMP Unknown)   SpO2 97%   BMI 20.71 kg/m    Easily conversant good spirited not in acute distress or toxic she is neatly attired today skin negative lymph negative lung sounds were diminished throughout no rales rhonchi or wheezes she had a wet cough that was audible couple times during exam the neck veins were nondistended there were no thyroid enlargement there was no thyroid nodules no carotid bruits the heart tones were distant and regular without murmur rub or gallop abdomen was otherwise benign thin scaphoid " nontender no liver spleen tip or other mass palpable the extremities are free of edema cyanosis or clubbing she appeared overall well she is thin her BMI is only 20.7 her blood pressure on recheck was excellent 138/80 and she is afebrile this afternoon her O2 sats on room air 97%.  Her COPD was based on a prior history of heavy smoking.  No longer is she a smoker.    Darius Mendoza MD    Internal Medicine  Answers submitted by the patient for this visit:  Patient Health Questionnaire (Submitted on 4/29/2024)  If you checked off any problems, how difficult have these problems made it for you to do your work, take care of things at home, or get along with other people?: Not difficult at all  PHQ9 TOTAL SCORE: 0

## 2024-05-01 DIAGNOSIS — G25.81 RESTLESS LEGS SYNDROME: ICD-10-CM

## 2024-05-01 DIAGNOSIS — I10 ESSENTIAL HYPERTENSION: Primary | ICD-10-CM

## 2024-05-01 RX ORDER — PREDNISONE 20 MG/1
TABLET ORAL
Qty: 8 TABLET | Refills: 3 | Status: SHIPPED | OUTPATIENT
Start: 2024-05-01

## 2024-05-01 RX ORDER — ROPINIROLE 0.5 MG/1
0.5 TABLET, FILM COATED ORAL AT BEDTIME
Qty: 90 TABLET | Refills: 2 | Status: SHIPPED | OUTPATIENT
Start: 2024-05-01

## 2024-05-01 NOTE — TELEPHONE ENCOUNTER
Patient calling to get a medication refill on medication(s) attached      Patient looking for a refill ASAP if possible     Patient is down to last      Contact Information  [phone] (Mobile)

## 2024-05-22 DIAGNOSIS — F33.9 MAJOR DEPRESSIVE DISORDER, RECURRENT EPISODE (H): ICD-10-CM

## 2024-05-22 DIAGNOSIS — F32.A DEPRESSION: ICD-10-CM

## 2024-05-22 RX ORDER — CITALOPRAM HYDROBROMIDE 20 MG/1
TABLET ORAL
Qty: 90 TABLET | Refills: 0 | Status: SHIPPED | OUTPATIENT
Start: 2024-05-22 | End: 2024-08-27

## 2024-05-22 NOTE — TELEPHONE ENCOUNTER
Patient calling to get a medication refill on medication(s) attached      Patient never picked up this prescription on 4/24/2024    Patient has now changed to [facility] from [facility]      Contact Information  [phone] (Mobile)

## 2024-06-03 DIAGNOSIS — J44.9 CHRONIC OBSTRUCTIVE PULMONARY DISEASE, UNSPECIFIED COPD TYPE (H): ICD-10-CM

## 2024-06-03 RX ORDER — ALBUTEROL SULFATE 0.83 MG/ML
SOLUTION RESPIRATORY (INHALATION)
Qty: 225 ML | Refills: 0 | Status: SHIPPED | OUTPATIENT
Start: 2024-06-03

## 2024-07-09 DIAGNOSIS — J44.9 CHRONIC OBSTRUCTIVE PULMONARY DISEASE, UNSPECIFIED COPD TYPE (H): ICD-10-CM

## 2024-07-09 RX ORDER — VARENICLINE TARTRATE 1 MG/1
TABLET, FILM COATED ORAL
Qty: 36 TABLET | Refills: 11 | Status: SHIPPED | OUTPATIENT
Start: 2024-07-09

## 2024-08-27 DIAGNOSIS — F33.9 MAJOR DEPRESSIVE DISORDER, RECURRENT EPISODE (H): ICD-10-CM

## 2024-08-27 DIAGNOSIS — F32.A DEPRESSION: ICD-10-CM

## 2024-08-27 RX ORDER — CITALOPRAM HYDROBROMIDE 20 MG/1
TABLET ORAL
Qty: 90 TABLET | Refills: 0 | Status: SHIPPED | OUTPATIENT
Start: 2024-08-27

## 2024-09-10 ENCOUNTER — OFFICE VISIT (OUTPATIENT)
Dept: INTERNAL MEDICINE | Facility: CLINIC | Age: 84
End: 2024-09-10
Payer: COMMERCIAL

## 2024-09-10 VITALS
TEMPERATURE: 97.7 F | BODY MASS INDEX: 20.61 KG/M2 | HEART RATE: 68 BPM | HEIGHT: 64 IN | DIASTOLIC BLOOD PRESSURE: 74 MMHG | OXYGEN SATURATION: 96 % | WEIGHT: 120.7 LBS | SYSTOLIC BLOOD PRESSURE: 140 MMHG | RESPIRATION RATE: 16 BRPM

## 2024-09-10 DIAGNOSIS — J44.9 CHRONIC OBSTRUCTIVE PULMONARY DISEASE, UNSPECIFIED COPD TYPE (H): Primary | ICD-10-CM

## 2024-09-10 PROCEDURE — G2211 COMPLEX E/M VISIT ADD ON: HCPCS | Performed by: INTERNAL MEDICINE

## 2024-09-10 PROCEDURE — 99214 OFFICE O/P EST MOD 30 MIN: CPT | Performed by: INTERNAL MEDICINE

## 2024-09-10 NOTE — PROGRESS NOTES
"Assessment/Plan:    (J44.9) Chronic obstructive pulmonary disease, unspecified COPD type (H)  (primary encounter diagnosis)  Comment: Stable no increased shortness of breath continue walking to increase pulmonary stability.  Plan: Non-smoker recommend annual flu vaccine with COVID-vaccine previously received the RSV vaccine.  Stable at this juncture.  Continue same inhalers Atrovent and Symbicort and albuterol rescue same.    Hypertension controlled stable 140/74 same meds and cares to be advised and to be continued.  Reemphasized salt restriction and diet.    Hyperlipidemia on statin therapy primary prevention no history of MI or stroke associated with same.    Anxiety better with Celexa 20 mg daily no suicidal ideations stable.        25 minutes spent on the date of the encounter doing chart review, patient visit, and documentation     Subjective:  Alpa Ty is a 83 year old female presents for the following health issues not anxious in repeating any lab test today.  Generally feels well walks 5 to 6 miles 5 times a week.  Feels well as well.  Denies any cough shortness of breath no hemoptysis.    ROS:  No blood in stool urine or sputum medication list reviewed reconciled in the chart.    Objective:  BP (!) 140/74   Pulse 68   Temp 97.7  F (36.5  C) (Oral)   Resp 16   Ht 1.632 m (5' 4.25\")   Wt 54.7 kg (120 lb 11.2 oz)   LMP  (LMP Unknown)   SpO2 96%   BMI 20.56 kg/m    Neck veins are nondistended there are no carotid bruits the lung sounds were diminished throughout a few dry rales at the left base heart tones regular rhythm without murmur rub or gallop somewhat distant abdomen benign she is thin extremities are free of edema cyanosis or clubbing afebrile vital signs are otherwise stable blood pressure 140/74 O2 sats on room air 96% BMI only 21.    Darius Mendoza MD  Internal Medicine    The longitudinal plan of care for the diagnosis(es)/condition(s) as documented were addressed during this " visit. Due to the added complexity in care, I will continue to support klaudia in the subsequent management and with ongoing continuity of care.      Answers submitted by the patient for this visit:  General Questionnaire (Submitted on 9/10/2024)  Chief Complaint: Chronic problems general questions HPI Form  What is the reason for your visit today? : general follow up  How many servings of fruits and vegetables do you eat daily?: 2-3  On average, how many sweetened beverages do you drink each day (Examples: soda, juice, sweet tea, etc.  Do NOT count diet or artificially sweetened beverages)?: 0  How many minutes a day do you exercise enough to make your heart beat faster?: 60 or more  How many days per week do you miss taking your medication?: 0

## 2024-11-12 DIAGNOSIS — J44.9 COPD (CHRONIC OBSTRUCTIVE PULMONARY DISEASE) (H): ICD-10-CM

## 2024-11-12 RX ORDER — IPRATROPIUM BROMIDE AND ALBUTEROL 20; 100 UG/1; UG/1
SPRAY, METERED RESPIRATORY (INHALATION)
Qty: 4 G | Refills: 2 | Status: SHIPPED | OUTPATIENT
Start: 2024-11-12

## 2024-12-02 DIAGNOSIS — J44.9 CHRONIC OBSTRUCTIVE PULMONARY DISEASE, UNSPECIFIED COPD TYPE (H): ICD-10-CM

## 2024-12-03 RX ORDER — ALBUTEROL SULFATE 0.83 MG/ML
SOLUTION RESPIRATORY (INHALATION)
Qty: 225 ML | Refills: 0 | Status: SHIPPED | OUTPATIENT
Start: 2024-12-03

## 2025-01-07 ENCOUNTER — OFFICE VISIT (OUTPATIENT)
Dept: INTERNAL MEDICINE | Facility: CLINIC | Age: 85
End: 2025-01-07
Payer: COMMERCIAL

## 2025-01-07 VITALS
TEMPERATURE: 98 F | HEART RATE: 84 BPM | DIASTOLIC BLOOD PRESSURE: 58 MMHG | WEIGHT: 120.3 LBS | OXYGEN SATURATION: 95 % | SYSTOLIC BLOOD PRESSURE: 128 MMHG | BODY MASS INDEX: 21.32 KG/M2 | HEIGHT: 63 IN | RESPIRATION RATE: 16 BRPM

## 2025-01-07 DIAGNOSIS — J44.9 CHRONIC OBSTRUCTIVE PULMONARY DISEASE, UNSPECIFIED COPD TYPE (H): Primary | ICD-10-CM

## 2025-01-07 DIAGNOSIS — N18.31 CHRONIC KIDNEY DISEASE, STAGE 3A (H): ICD-10-CM

## 2025-01-07 PROCEDURE — 99214 OFFICE O/P EST MOD 30 MIN: CPT | Performed by: INTERNAL MEDICINE

## 2025-01-07 PROCEDURE — G2211 COMPLEX E/M VISIT ADD ON: HCPCS | Performed by: INTERNAL MEDICINE

## 2025-01-07 RX ORDER — PREDNISONE 10 MG/1
10 TABLET ORAL DAILY
Qty: 90 TABLET | Refills: 11 | Status: SHIPPED | OUTPATIENT
Start: 2025-01-07

## 2025-01-07 RX ORDER — AZITHROMYCIN 250 MG/1
TABLET, FILM COATED ORAL
Qty: 6 TABLET | Refills: 1 | Status: SHIPPED | OUTPATIENT
Start: 2025-01-07 | End: 2025-01-12

## 2025-01-07 ASSESSMENT — PATIENT HEALTH QUESTIONNAIRE - PHQ9: SUM OF ALL RESPONSES TO PHQ QUESTIONS 1-9: 1

## 2025-01-07 ASSESSMENT — PAIN SCALES - GENERAL: PAINLEVEL_OUTOF10: NO PAIN (0)

## 2025-01-07 NOTE — NURSING NOTE
Faxed oxygen order to [facility] per pts request.       Buck Franco Jr., CMA on 1/7/2025 at 10:28 AM

## 2025-01-07 NOTE — PROGRESS NOTES
"  {PROVIDER CHARTING PREFERENCE:300472}    Subjective   klaudia is a 84 year old, presenting for the following health issues:  Hospital F/U (Patient states she has trouble breathing and would like prescription for cough. /Patient oxygen level is low. Patient states the color of her phlegm is dark. )        1/7/2025     9:43 AM   Additional Questions   Roomed by Alvin PRATT MA   Accompanied by      HPI     {MA/LPN/RN Pre-Provider Visit Orders- hCG/UA/Strep (Optional):282725}    Hospital Follow-up Visit:    Hospital/Nursing Home/IP Rehab Facility:  [facility]   Date of Admission: 12/30/2024  Date of Discharge: 01/02/2025  Reason(s) for Admission: Influenza A  Was the patient in the ICU or did the patient experience delirium during hospitalization?  No  Do you have any other stressors you would like to discuss with your provider? OTHER: Patient needs approval into order to get a carry pack for oxygen.     Problems taking medications regularly:  None  Medication changes since discharge: None  Problems adhering to non-medication therapy:  None    Summary of hospitalization:  {:594108}  Diagnostic Tests/Treatments reviewed.  Follow up needed: {:684447:}  Other Healthcare Providers Involved in Patient s Care:         {those currently involved after discharge:929469::\"None\"}  Update since discharge: {:474240} {TIP  Include information from family/caregivers, SNF, Care Coordination :680772}        Plan of care communicated with {:451945}     {Reference  Coding guidelines- Moderate Complexity F2F/Video within 7 - 14 days of discharge [id number], High Complexity F2F/Video within 7 days [id number] or [id number] days [id number] :151999}      {additonal problems for provider to add (Optional):989708}  {additonal problems for provider to add (Optional):865709}    {ROS Picklists (Optional):954412}      Objective    /58   Pulse 84   Temp 98  F (36.7  C) (Oral)   Resp 16   Ht 1.603 m (5' 3.11\")   Wt 54.6 kg (120 lb 4.8 oz)  "  LMP  (LMP Unknown)   SpO2 95%   BMI 21.24 kg/m    Body mass index is 21.24 kg/m .  Physical Exam   {Exam List (Optional):146826}    {Diagnostic Test Results (Optional):132215}        Signed Electronically by: Darius Mendoza MD  {Email feedback regarding this note to [email or web address]   :152580}  Answers submitted by the patient for this visit:  Patient Health Questionnaire (Submitted on 1/7/2025)  PHQ9 TOTAL SCORE: Incomplete

## 2025-01-07 NOTE — PROGRESS NOTES
Assessment/Plan:    (J44.9) Chronic obstructive pulmonary disease, unspecified COPD type (H)  (primary encounter diagnosis)  Comment: Recent hospital stay 4 days [facility] part of the [facility] system for COPD complicated by influenza A and COPD exacerbation.  Now on prednisone taper.  Treated with Tamiflu for 5 days.   accompanies patient here today.  Patient is feeling better she is dependent on oxygen now and requests a home concentrator portable oxygen source.  This will be ordered.  Plan: azithromycin (ZITHROMAX) 250 MG tablet,         predniSONE (DELTASONE) 10 MG tablet        Encourage good hydration and walking to enhance pulmonary toilet.  Recheck 2 months time.    (N18.31) Chronic kidney disease, stage 3a (H)  Comment: Stable.  Plan: Will recheck serum creatinine as part of a basic metabolic profile hemogram with next office visit 2 months time.  Avoidance of nonsteroidal anti-inflammatory drugs advisable.  As the lower prostaglandin levels have a major source of renal artery dilatation.  And enhance renal blood flow.  Acetaminophen or Tylenol would be safer.        25 minutes spent on the date of the encounter doing chart review, review of outside records, patient visit, documentation, and discussion with family     Subjective:  Alpa Ty is a 84 year old female presents for the following health issues accompanied by her .  The patient has had a history of influenza A on top of underlying COPD which she was hospitalized for 4 days at [facility] in [place] she is seen today in follow-up.  The patient's medical records were not available for perusal at this juncture.  The patient was treated with IV steroids and Tamiflu during her hospitalization.  The patient is improved this morning she had an episode of severe shortness of breath when she was lying recumbent in bed her O2 sats today in the office are 95% on supplemental nasal O2 1 to 2 L/min.  The patient's had no leg edema  "and does not feel better complicated staphylococcal coccus organism and Klebsiella pneumonia.  Chest x-ray done during hospitalization    ROS:  No blood in stool urine sputum med list reviewed reconciled in the chart.    Objective:  /58   Pulse 84   Temp 98  F (36.7  C) (Oral)   Resp 16   Ht 1.603 m (5' 3.11\")   Wt 54.6 kg (120 lb 4.8 oz)   LMP  (LMP Unknown)   SpO2 95%   BMI 21.24 kg/m    Neck veins were nondistended no leg edema there are no carotid bruits she is pale not in acute distress or toxic she is afebrile and her vital signs are stable BMI is only 21.  Previously she had been a heavy smoker for short period of time the patient started smoking at age 40.  Chest was diminished breath sounds throughout with squeaky rhonchi noted in the bases.  No rales.  Heart tones were distant abdomen benign there is no acral cyanosis or clubbing there is no central cyanosis.  No tachypnea.  Respiratory rate only 16 pulse okay at 84 blood pressure normal.    Darius Mendoza MD  Internal Medicine    The longitudinal plan of care for the diagnosis(es)/condition(s) as documented were addressed during this visit. Due to the added complexity in care, I will continue to support klaudia in the subsequent management and with ongoing continuity of care.      Answers submitted by the patient for this visit:  Patient Health Questionnaire (Submitted on 1/7/2025)  PHQ9 TOTAL SCORE: Incomplete    "

## 2025-02-01 ENCOUNTER — NURSE TRIAGE (OUTPATIENT)
Dept: NURSING | Facility: CLINIC | Age: 85
End: 2025-02-01
Payer: COMMERCIAL
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
## 2025-02-03 ENCOUNTER — TELEPHONE (OUTPATIENT)
Dept: INTERNAL MEDICINE | Facility: CLINIC | Age: 85
End: 2025-02-03
Payer: COMMERCIAL
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
## 2025-02-05 DIAGNOSIS — J44.9 COPD (CHRONIC OBSTRUCTIVE PULMONARY DISEASE) (H): ICD-10-CM

## 2025-02-05 RX ORDER — IPRATROPIUM BROMIDE AND ALBUTEROL 20; 100 UG/1; UG/1
SPRAY, METERED RESPIRATORY (INHALATION)
Qty: 4 G | Refills: 2 | Status: SHIPPED | OUTPATIENT
Start: 2025-02-05

## 2025-02-13 ENCOUNTER — OFFICE VISIT (OUTPATIENT)
Dept: INTERNAL MEDICINE | Facility: CLINIC | Age: 85
End: 2025-02-13
Payer: COMMERCIAL

## 2025-02-13 VITALS
RESPIRATION RATE: 16 BRPM | OXYGEN SATURATION: 98 % | DIASTOLIC BLOOD PRESSURE: 72 MMHG | SYSTOLIC BLOOD PRESSURE: 140 MMHG | HEIGHT: 63 IN | BODY MASS INDEX: 20.2 KG/M2 | TEMPERATURE: 98.2 F | WEIGHT: 114 LBS | HEART RATE: 75 BPM

## 2025-02-13 DIAGNOSIS — J44.1 CHRONIC OBSTRUCTIVE PULMONARY DISEASE WITH (ACUTE) EXACERBATION (H): ICD-10-CM

## 2025-02-13 DIAGNOSIS — N18.31 CHRONIC KIDNEY DISEASE, STAGE 3A (H): ICD-10-CM

## 2025-02-13 DIAGNOSIS — S32.010D COMPRESSION FRACTURE OF L1 VERTEBRA WITH ROUTINE HEALING, SUBSEQUENT ENCOUNTER: Primary | ICD-10-CM

## 2025-02-13 RX ORDER — GABAPENTIN 100 MG/1
100 CAPSULE ORAL 3 TIMES DAILY
Qty: 60 CAPSULE | Refills: 11 | Status: SHIPPED | OUTPATIENT
Start: 2025-02-13

## 2025-02-13 RX ORDER — OXYCODONE HYDROCHLORIDE 5 MG/1
2.5-5 TABLET ORAL PRN
COMMUNITY
Start: 2025-02-02
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
## 2025-03-31 ENCOUNTER — PATIENT OUTREACH (OUTPATIENT)
Dept: CARE COORDINATION | Facility: CLINIC | Age: 85
End: 2025-03-31
Payer: COMMERCIAL

## 2025-04-03 DIAGNOSIS — Z00.00 ENCOUNTER FOR GENERAL ADULT MEDICAL EXAMINATION WITHOUT ABNORMAL FINDINGS: ICD-10-CM

## 2025-04-03 RX ORDER — BUDESONIDE AND FORMOTEROL FUMARATE DIHYDRATE 160; 4.5 UG/1; UG/1
1 AEROSOL RESPIRATORY (INHALATION) 2 TIMES DAILY
Qty: 10.2 G | Refills: 11 | Status: SHIPPED | OUTPATIENT
Start: 2025-04-03

## 2025-04-14 ENCOUNTER — PATIENT OUTREACH (OUTPATIENT)
Dept: CARE COORDINATION | Facility: CLINIC | Age: 85
End: 2025-04-14

## 2025-04-14 ENCOUNTER — OFFICE VISIT (OUTPATIENT)
Dept: INTERNAL MEDICINE | Facility: CLINIC | Age: 85
End: 2025-04-14
Payer: COMMERCIAL

## 2025-04-14 VITALS
WEIGHT: 118.5 LBS | RESPIRATION RATE: 16 BRPM | OXYGEN SATURATION: 96 % | DIASTOLIC BLOOD PRESSURE: 68 MMHG | HEIGHT: 64 IN | TEMPERATURE: 98.6 F | BODY MASS INDEX: 20.23 KG/M2 | HEART RATE: 76 BPM | SYSTOLIC BLOOD PRESSURE: 146 MMHG

## 2025-04-14 DIAGNOSIS — I10 HTN (HYPERTENSION): ICD-10-CM

## 2025-04-14 DIAGNOSIS — J44.9 CHRONIC OBSTRUCTIVE PULMONARY DISEASE, UNSPECIFIED COPD TYPE (H): ICD-10-CM

## 2025-04-14 DIAGNOSIS — N18.31 CHRONIC KIDNEY DISEASE, STAGE 3A (H): Primary | ICD-10-CM

## 2025-04-14 DIAGNOSIS — I10 ESSENTIAL HYPERTENSION: ICD-10-CM

## 2025-04-14 DIAGNOSIS — K58.9 IRRITABLE BOWEL SYNDROME WITHOUT DIARRHEA: ICD-10-CM

## 2025-04-14 PROCEDURE — 3077F SYST BP >= 140 MM HG: CPT | Performed by: INTERNAL MEDICINE

## 2025-04-14 PROCEDURE — 99213 OFFICE O/P EST LOW 20 MIN: CPT | Performed by: INTERNAL MEDICINE

## 2025-04-14 PROCEDURE — G2211 COMPLEX E/M VISIT ADD ON: HCPCS | Performed by: INTERNAL MEDICINE

## 2025-04-14 PROCEDURE — 3078F DIAST BP <80 MM HG: CPT | Performed by: INTERNAL MEDICINE

## 2025-04-14 PROCEDURE — 1126F AMNT PAIN NOTED NONE PRSNT: CPT | Performed by: INTERNAL MEDICINE

## 2025-04-14 RX ORDER — LIDOCAINE PAIN RELIEF 40 MG/1000MG
1 PATCH TOPICAL EVERY 24 HOURS
COMMUNITY

## 2025-04-14 RX ORDER — CEPHALEXIN 500 MG/1
500 CAPSULE ORAL 2 TIMES DAILY
COMMUNITY
Start: 2024-10-27

## 2025-04-14 RX ORDER — ALBUTEROL SULFATE 0.83 MG/ML
SOLUTION RESPIRATORY (INHALATION)
Qty: 3 ML | Refills: 3 | Status: SHIPPED | OUTPATIENT
Start: 2025-04-14

## 2025-04-14 RX ORDER — SIMVASTATIN 20 MG
20 TABLET ORAL
Qty: 90 TABLET | Refills: 3 | Status: SHIPPED | OUTPATIENT
Start: 2025-04-14

## 2025-04-14 ASSESSMENT — PAIN SCALES - GENERAL: PAINLEVEL_OUTOF10: NO PAIN (0)

## 2025-04-14 NOTE — PROGRESS NOTES
"Assessment/Plan:    (N18.31) Chronic kidney disease, stage 3a (H)  (primary encounter diagnosis)  Comment: Stable.  Plan: Recheck serum creatinine blood urea nitrogen potassium and CO2 level with next annual wellness visit in 4 months time.  Patient declined lab testing today.    (K58.9) Irritable bowel syndrome without diarrhea  Comment: No irregularity in bowel movements constipation diarrhea.  No abdominal pain no weight loss no blood in stool or urine  Plan: Advised daily exercise 3 miles per day as she does has done.  Suggest Metamucil 1 heaping tablespoon daily and water or juice    (I10) Essential hypertension  Comment: Controlled stable mild systolic elevation 146/68 today.  Plan: Same meds and cares salt restriction diet regular exercise.  BMI is ideal at 20.3.        25 minutes spent on the date of the encounter doing chart review, patient visit, and documentation     Subjective:  Alpa Ty is a 84 year old female presents for the following health issues for Quad testing patient declined will check in 4 months with annual wellness visit and comprehensive lab testing.    ROS:  No blood in stool or urine denies chest pain shortness of breath medication list reviewed reconciled in the chart.  We had a good examination and a good discussion today.    Objective:  BP (!) 146/68   Pulse 76   Temp 98.6  F (37  C) (Oral)   Resp 16   Ht 1.626 m (5' 4\")   Wt 53.8 kg (118 lb 8 oz)   LMP  (LMP Unknown)   SpO2 96%   BMI 20.34 kg/m    Easily conversant neatly attired soft-spoken.  Exercises regularly 3 miles per day.    Neck veins nondistended no thyromegaly no thyroid nodules no carotid bruits.  Back straight no severe spine tenderness chest is clear posteriorly heart tones regular rhythm without murmur rub or gallop abdomen is benign excellent abdominis rectus muscles for age.  84.  Exercises regularly extremities are free of edema cyanosis or clubbing.    Recheck 4 months time annual wellness visit with " comprehensive laboratory testing.    Darius Mendoza MD  Internal Medicine    The longitudinal plan of care for the diagnosis(es)/condition(s) as documented were addressed during this visit. Due to the added complexity in care, I will continue to support klaudia in the subsequent management and with ongoing continuity of care.      Answers submitted by the patient for this visit:  COPD (Chronic Obstructive Pulmonary Disease) Visit Questionnaire (Submitted on 4/14/2025)  Chief Complaint: Chronic problems general questions HPI Form  Current status of COPD symptom:: no change  Status of fatigue and dyspnea with ambulation:: same as usual  Status of dyspnea:: same as usual  Increase or change in cough or sputum:: sometimes  Have you noticed a change in your sputum/phlegm?: No  Have you experienced a recent fever?: No  Baseline ambulation without stopping to rest:: greater than 2 miles  Number of flights of stairs without resting:: 3 or more  Have you had any Emergency Room, Urgent Care visits or a Hospital admission because of your COPD since your last office visit?: No  General Questionnaire (Submitted on 4/14/2025)  Chief Complaint: Chronic problems general questions HPI Form  How many servings of fruits and vegetables do you eat daily?: 2-3  On average, how many sweetened beverages do you drink each day (Examples: soda, juice, sweet tea, etc.  Do NOT count diet or artificially sweetened beverages)?: 0  How many minutes a day do you exercise enough to make your heart beat faster?: 60 or more  How many days a week do you exercise enough to make your heart beat faster?: 7  How many days per week do you miss taking your medication?: 0  Questionnaire about: Chronic problems general questions HPI Form (Submitted on 4/14/2025)  Chief Complaint: Chronic problems general questions HPI Form

## 2025-04-23 DIAGNOSIS — J44.9 CHRONIC OBSTRUCTIVE PULMONARY DISEASE, UNSPECIFIED COPD TYPE (H): ICD-10-CM

## 2025-05-05 DIAGNOSIS — J44.9 COPD (CHRONIC OBSTRUCTIVE PULMONARY DISEASE) (H): ICD-10-CM

## 2025-05-05 RX ORDER — IPRATROPIUM BROMIDE AND ALBUTEROL 20; 100 UG/1; UG/1
SPRAY, METERED RESPIRATORY (INHALATION)
Qty: 4 G | Refills: 2 | Status: SHIPPED | OUTPATIENT
Start: 2025-05-05

## 2025-05-05 RX ORDER — IPRATROPIUM BROMIDE AND ALBUTEROL 20; 100 UG/1; UG/1
SPRAY, METERED RESPIRATORY (INHALATION)
Qty: 4 G | Refills: 2 | OUTPATIENT
Start: 2025-05-05

## 2025-05-05 NOTE — TELEPHONE ENCOUNTER
Medication Question or Refill        What medication are you calling about (include dose and sig)?:    Disp Refills Start End ELIAN   COMBIVENT RESPIMAT  MCG/ACT inhaler 4 g 2 2/5/2025 -- No   Sig: INHALE 1 PUFF BY MOUTH FOUR TIMES DAILY   Sent to pharmacy as: Combivent Respimat  MCG/ACT Inhalation Aerosol Solution (ipratropium-albuterol)   Class: E-Prescribe   Order: [id number]   E-Prescribing Status: Receipt confirmed by pharmacy (2/5/2025  8:52 AM CST)       Preferred Pharmacy:  [id number] - [place], [place] - [address] [place] [address] AT [facility] & [address] [place] [address]  Phone: [phone] Fax: [phone]      Controlled Substance Agreement on file:   CSA -- Patient Level:    CSA: None found at the patient level.       Who prescribed the medication?: PCP    Do you need a refill? Yes      Could we send this information to you in [employer] or would you prefer to receive a phone call?:   Patient would prefer a phone call   Okay to leave a detailed message?: Yes at Cell number on file:    Telephone Information:   Mobile [phone]

## 2025-05-05 NOTE — TELEPHONE ENCOUNTER
Medication Question or Refill        What medication are you calling about (include dose and sig)?:    Disp Refills Start End ELIAN   COMBIVENT RESPIMAT  MCG/ACT inhaler 4 g 2 2/5/2025 -- No   Sig: INHALE 1 PUFF BY MOUTH FOUR TIMES DAILY   Sent to pharmacy as: Combivent Respimat  MCG/ACT Inhalation Aerosol Solution (ipratropium-albuterol)   Class: E-Prescribe   Order: [id number]   E-Prescribing Status: Receipt confirmed by pharmacy (2/5/2025  8:52 AM CST)       Preferred Pharmacy:    [id number] - [place], [place] - [address] [place] [address] AT [facility] & [address] [place] [address]  Phone: [phone] Fax: [phone]      Controlled Substance Agreement on file:   CSA -- Patient Level:    CSA: None found at the patient level.       Who prescribed the medication?: PCP    Do you need a refill? Yes    Could we send this information to you in [employer] or would you prefer to receive a phone call?:   Patient would prefer a phone call   Okay to leave a detailed message?: Yes at Cell number on file:    Telephone Information:   Mobile [phone]

## 2025-05-19 DIAGNOSIS — Z00.00 ENCOUNTER FOR GENERAL ADULT MEDICAL EXAMINATION WITHOUT ABNORMAL FINDINGS: ICD-10-CM

## 2025-05-19 DIAGNOSIS — J44.9 CHRONIC OBSTRUCTIVE PULMONARY DISEASE, UNSPECIFIED COPD TYPE (H): Primary | ICD-10-CM

## 2025-05-19 RX ORDER — BUDESONIDE AND FORMOTEROL FUMARATE DIHYDRATE 160; 4.5 UG/1; UG/1
1 AEROSOL RESPIRATORY (INHALATION) 2 TIMES DAILY
Qty: 10.2 G | Refills: 11 | Status: SHIPPED | OUTPATIENT
Start: 2025-05-19

## 2025-05-28 ENCOUNTER — TRANSFERRED RECORDS (OUTPATIENT)
Dept: HEALTH INFORMATION MANAGEMENT | Facility: CLINIC | Age: 85
End: 2025-05-28
Payer: COMMERCIAL

## 2025-06-07 ENCOUNTER — HEALTH MAINTENANCE LETTER (OUTPATIENT)
Age: 85
End: 2025-06-07

## 2025-06-16 ENCOUNTER — NURSE TRIAGE (OUTPATIENT)
Dept: INTERNAL MEDICINE | Facility: CLINIC | Age: 85
End: 2025-06-16
Payer: COMMERCIAL

## 2025-06-16 NOTE — TELEPHONE ENCOUNTER
Spoke with patient and relayed information below from Dr Mendoza. Patient verbalized understanding and has no further questions.

## 2025-06-16 NOTE — TELEPHONE ENCOUNTER
"Nurse Triage SBAR    Is this a 2nd Level Triage? YES, LICENSED PRACTITIONER REVIEW IS REQUIRED    Situation: numbness in feet    Background: recent L1 compression fracture     Assessment: States she believes this numbness is from the compression fracture. States she has numbness in the bottoms of both her feet, but it is worse in the left. Denies any other symptoms. States \"I can live with it if I need to.\"    Protocol Recommended Disposition:   See in Office Within 3 Days    Recommendation: Advised she should schedule a visit to discuss this with Dr Mendoza. States she has a visit coming up on 8/25 and declines to schedule anything else at this time. Writer informed patient that a message will be sent to Dr Mendoza to see if he would like her to be seen sooner or if she is ok to wait until that visit.      Routed to provider    Does the patient meet one of the following criteria for ADS visit consideration? 16+ years old, with an [facility] PCP     TIP  Providers, please consider if this condition is appropriate for management at one of our Acute and Diagnostic Services sites.     If patient is a good candidate, please use dotphrase <dot>triageresponse and select Refer to ADS to document.      Reason for Disposition   Numbness or tingling on both sides of body and is a new symptom lasting > 24 hours    Additional Information   Negative: Weakness of arm or leg is a chronic symptom (recurrent or ongoing problem lasting > 4 weeks)   Negative: Numbness or tingling in one or both hands is a chronic symptom (recurrent or ongoing problem lasting > 4 weeks)   Negative: Numbness or tingling in one or both feet is a chronic symptom (recurrent or ongoing problem lasting > 4 weeks)   Negative: Neck pain (with neurologic deficit)   Negative: Back pain (with neurologic deficit)   Negative: Patient wants to be seen   Negative: Loss of speech or garbled speech is a chronic symptom (recurrent or ongoing problem lasting > 4 weeks)   " Negative: Neurologic deficit of gradual onset (e.g., days to weeks), ANY of the following: * Weakness of the face, arm, or leg on one side of the body* Numbness of the face, arm, or leg on one side of the body* Loss of speech or garbled speech   Negative: Trinchera palsy suspected (i.e., weakness only one side of the face, developing over hours to days, no other symptoms)   Negative: Tingling (e.g., pins and needles) of the face, arm or leg on one side of the body, that is present now  (Exceptions: Chronic or recurrent symptom lasting > 4 weeks; or from known cause, such as: bumped elbow, carpal tunnel, pinched nerve.)   Negative: Neurologic deficit that was brief (now gone), ANY of the following: * Weakness of the face, arm, or leg on one side of the body * Numbness of the face, arm, or leg on one side of the body * Loss of speech or garbled speech   Negative: Patient sounds very sick or weak to the triager   Negative: Confusion, disorientation, or hallucinations is main symptom   Negative: Dizziness is main symptom   Negative: Followed a head injury within last 3 days   Negative: Headache (with neurologic deficit)   Negative: Unable to urinate (or only a few drops) and bladder feels very full   Negative: Loss of bladder or bowel control (urine or bowel incontinence; wetting self, leaking stool) of new-onset   Negative: Back pain with numbness (loss of sensation) in groin or rectal area   Negative: SEVERE weakness (e.g., unable to walk or barely able to walk, requires support) and new-onset or getting worse   Negative: Difficult to awaken or acting confused (e.g., disoriented, slurred speech)   Negative: Sudden onset of weakness of the face, arm or leg on one side of the body and present now (Exception: Bell's palsy suspected: weakness on one side of the face developing over hours to days, with no other symptoms.)   Negative: Sudden onset of numbness of the face, arm or leg on one side of the body and present now    "Negative: Sudden onset of loss of speech or garbled speech and present now   Negative: Sounds like a life-threatening emergency to the triager    Answer Assessment - Initial Assessment Questions  1. SYMPTOM: \"What is the main symptom you are concerned about?\" (e.g., weakness, numbness)      Numbness in feet, primarily left  2. ONSET: \"When did this start?\" (minutes, hours, days; while sleeping)      A few weeks ago  3. LAST NORMAL: \"When was the last time you (the patient) were normal (no symptoms)?\"      Prior to L1 compression fracture  4. PATTERN \"Does this come and go, or has it been constant since it started?\"  \"Is it present now?\"      constant  5. CARDIAC SYMPTOMS: \"Have you had any of the following symptoms: chest pain, difficulty breathing, palpitations?\"      Denies   6. NEUROLOGIC SYMPTOMS: \"Have you had any of the following symptoms: headache, dizziness, vision loss, double vision, changes in speech, unsteady on your feet?\"      Denies   7. OTHER SYMPTOMS: \"Do you have any other symptoms?\"      Denies   8. PREGNANCY: \"Is there any chance you are pregnant?\" \"When was your last menstrual period?\"      N/A    Protocols used: Neurologic Deficit-A-[place]    "

## 2025-07-22 DIAGNOSIS — J44.9 CHRONIC OBSTRUCTIVE PULMONARY DISEASE, UNSPECIFIED COPD TYPE (H): ICD-10-CM

## 2025-07-22 RX ORDER — ALBUTEROL SULFATE 0.83 MG/ML
SOLUTION RESPIRATORY (INHALATION)
Qty: 180 ML | OUTPATIENT
Start: 2025-07-22

## 2025-07-28 DIAGNOSIS — J44.9 CHRONIC OBSTRUCTIVE PULMONARY DISEASE, UNSPECIFIED COPD TYPE (H): ICD-10-CM

## 2025-07-28 RX ORDER — ALBUTEROL SULFATE 0.83 MG/ML
SOLUTION RESPIRATORY (INHALATION)
Qty: 180 ML | Refills: 11 | Status: SHIPPED | OUTPATIENT
Start: 2025-07-28

## 2025-08-25 ENCOUNTER — OFFICE VISIT (OUTPATIENT)
Dept: INTERNAL MEDICINE | Facility: CLINIC | Age: 85
End: 2025-08-25
Payer: COMMERCIAL

## 2025-08-25 VITALS
OXYGEN SATURATION: 96 % | WEIGHT: 121.7 LBS | DIASTOLIC BLOOD PRESSURE: 80 MMHG | TEMPERATURE: 98.4 F | RESPIRATION RATE: 16 BRPM | SYSTOLIC BLOOD PRESSURE: 154 MMHG | HEART RATE: 80 BPM | HEIGHT: 64 IN | BODY MASS INDEX: 20.78 KG/M2

## 2025-08-25 DIAGNOSIS — I10 ESSENTIAL HYPERTENSION: ICD-10-CM

## 2025-08-25 DIAGNOSIS — J44.9 CHRONIC OBSTRUCTIVE PULMONARY DISEASE, UNSPECIFIED COPD TYPE (H): Primary | ICD-10-CM

## 2025-08-25 DIAGNOSIS — E78.2 MIXED HYPERLIPIDEMIA: ICD-10-CM

## 2025-08-25 PROCEDURE — 1126F AMNT PAIN NOTED NONE PRSNT: CPT | Performed by: INTERNAL MEDICINE

## 2025-08-25 PROCEDURE — 3077F SYST BP >= 140 MM HG: CPT | Performed by: INTERNAL MEDICINE

## 2025-08-25 PROCEDURE — 99214 OFFICE O/P EST MOD 30 MIN: CPT | Performed by: INTERNAL MEDICINE

## 2025-08-25 PROCEDURE — G2211 COMPLEX E/M VISIT ADD ON: HCPCS | Performed by: INTERNAL MEDICINE

## 2025-08-25 PROCEDURE — 3079F DIAST BP 80-89 MM HG: CPT | Performed by: INTERNAL MEDICINE

## 2025-08-25 ASSESSMENT — PAIN SCALES - GENERAL: PAINLEVEL_OUTOF10: NO PAIN (0)
